# Patient Record
Sex: MALE | Race: WHITE | NOT HISPANIC OR LATINO | Employment: OTHER | URBAN - METROPOLITAN AREA
[De-identification: names, ages, dates, MRNs, and addresses within clinical notes are randomized per-mention and may not be internally consistent; named-entity substitution may affect disease eponyms.]

---

## 2017-10-20 ENCOUNTER — HOSPITAL ENCOUNTER (EMERGENCY)
Facility: MEDICAL CENTER | Age: 67
End: 2017-10-21
Attending: EMERGENCY MEDICINE
Payer: MEDICARE

## 2017-10-20 ENCOUNTER — APPOINTMENT (OUTPATIENT)
Dept: RADIOLOGY | Facility: MEDICAL CENTER | Age: 67
End: 2017-10-20
Attending: EMERGENCY MEDICINE
Payer: MEDICARE
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
## 2017-10-20 DIAGNOSIS — S01.01XA LACERATION OF SCALP, INITIAL ENCOUNTER: ICD-10-CM

## 2017-10-20 DIAGNOSIS — S01.112A LACERATION OF LEFT EYEBROW, INITIAL ENCOUNTER: ICD-10-CM

## 2017-10-20 DIAGNOSIS — S06.0X1A CONCUSSION WITH LOSS OF CONSCIOUSNESS OF 30 MINUTES OR LESS, INITIAL ENCOUNTER: ICD-10-CM

## 2017-10-20 DIAGNOSIS — S00.03XA HEMATOMA OF SCALP, INITIAL ENCOUNTER: ICD-10-CM

## 2017-10-20 PROCEDURE — 90715 TDAP VACCINE 7 YRS/> IM: CPT | Performed by: EMERGENCY MEDICINE

## 2017-10-20 PROCEDURE — 99284 EMERGENCY DEPT VISIT MOD MDM: CPT

## 2017-10-20 PROCEDURE — 90471 IMMUNIZATION ADMIN: CPT

## 2017-10-20 PROCEDURE — 304999 HCHG REPAIR-SIMPLE/INTERMED LEVEL 1

## 2017-10-20 PROCEDURE — 303353 HCHG DERMABOND SKIN ADHESIVE

## 2017-10-20 PROCEDURE — 70450 CT HEAD/BRAIN W/O DYE: CPT

## 2017-10-20 PROCEDURE — 700101 HCHG RX REV CODE 250: Performed by: EMERGENCY MEDICINE

## 2017-10-20 PROCEDURE — 700111 HCHG RX REV CODE 636 W/ 250 OVERRIDE (IP): Performed by: EMERGENCY MEDICINE

## 2017-10-20 RX ORDER — HYDROCODONE BITARTRATE AND ACETAMINOPHEN 5; 325 MG/1; MG/1
1-2 TABLET ORAL EVERY 6 HOURS PRN
Qty: 12 TAB | Refills: 0 | Status: SHIPPED | OUTPATIENT
Start: 2017-10-20 | End: 2024-01-30

## 2017-10-20 RX ORDER — LIDOCAINE HCL/EPINEPHRINE/PF 2%-1:200K
20 VIAL (ML) INJECTION ONCE
Status: COMPLETED | OUTPATIENT
Start: 2017-10-20 | End: 2017-10-20

## 2017-10-20 RX ORDER — HYDROCODONE BITARTRATE AND ACETAMINOPHEN 5; 325 MG/1; MG/1
1 TABLET ORAL ONCE
Status: COMPLETED | OUTPATIENT
Start: 2017-10-21 | End: 2017-10-21

## 2017-10-20 RX ORDER — LIDOCAINE HYDROCHLORIDE AND EPINEPHRINE 10; 10 MG/ML; UG/ML
20 INJECTION, SOLUTION INFILTRATION; PERINEURAL ONCE
Status: DISCONTINUED | OUTPATIENT
Start: 2017-10-20 | End: 2017-10-20

## 2017-10-20 RX ADMIN — CLOSTRIDIUM TETANI TOXOID ANTIGEN (FORMALDEHYDE INACTIVATED), CORYNEBACTERIUM DIPHTHERIAE TOXOID ANTIGEN (FORMALDEHYDE INACTIVATED), BORDETELLA PERTUSSIS TOXOID ANTIGEN (GLUTARALDEHYDE INACTIVATED), BORDETELLA PERTUSSIS FILAMENTOUS HEMAGGLUTININ ANTIGEN (FORMALDEHYDE INACTIVATED), BORDETELLA PERTUSSIS PERTACTIN ANTIGEN, AND BORDETELLA PERTUSSIS FIMBRIAE 2/3 ANTIGEN 0.5 ML: 5; 2; 2.5; 5; 3; 5 INJECTION, SUSPENSION INTRAMUSCULAR at 22:35

## 2017-10-20 RX ADMIN — LIDOCAINE HYDROCHLORIDE,EPINEPHRINE BITARTRATE 20 ML: 20; .005 INJECTION, SOLUTION EPIDURAL; INFILTRATION; INTRACAUDAL; PERINEURAL at 22:30

## 2017-10-21 VITALS
BODY MASS INDEX: 30.06 KG/M2 | SYSTOLIC BLOOD PRESSURE: 134 MMHG | OXYGEN SATURATION: 93 % | WEIGHT: 210 LBS | RESPIRATION RATE: 18 BRPM | HEIGHT: 70 IN | DIASTOLIC BLOOD PRESSURE: 87 MMHG | TEMPERATURE: 97.7 F | HEART RATE: 75 BPM

## 2017-10-21 PROCEDURE — 700102 HCHG RX REV CODE 250 W/ 637 OVERRIDE(OP): Performed by: EMERGENCY MEDICINE

## 2017-10-21 PROCEDURE — A9270 NON-COVERED ITEM OR SERVICE: HCPCS | Performed by: EMERGENCY MEDICINE

## 2017-10-21 RX ADMIN — HYDROCODONE BITARTRATE AND ACETAMINOPHEN 1 TABLET: 5; 325 TABLET ORAL at 00:16

## 2017-10-21 NOTE — ED NOTES
BIB EMS    Chief Complaint   Patient presents with   • T-5000 GLF     pt reports have GLF after tripping on his own feet and hitting left side of head with unknown LOC. pt denies N/V, blurred vision. pt had hematoma over left eye adn left sided head lac       Pt in gown, on monitor, chart up for ERP.

## 2017-10-21 NOTE — ED PROVIDER NOTES
ED Provider Note    Scribed for Sai Dickson M.D. by Geni Perdomo. 10/20/2017, 10:00 PM.    Primary care provider: Reji Johnson M.D.  Means of arrival: Ambulance  History obtained from: Patient  History limited by: None    CHIEF COMPLAINT  Chief Complaint   Patient presents with   • T-5000 GLF     pt reports have GLF after tripping on his own feet and hitting left side of head with unknown LOC. pt denies N/V, blurred vision. pt had hematoma over left eye adn left sided head lac       HPI  Onofre Mauricio Mccormack is a 67 y.o. male who presents to the Emergency Department for evaluation after a ground level fall with associated left sided head trauma that occurred earlier tonight. The patient reports he tripped over his feet and and hit the left side of his head on the concrete. He states he lost consciousness and woke up with people around him. The patient denies back pain, leg pain, abdominal pain, vision changes, vomiting, or neck pain. He is not currently on any blood thinners. The patient reports his last Tetanus shot was more than five years ago. He does not identify any exacerbating factors at this time.     REVIEW OF SYSTEMS  ROS  Pertinent Positivies: ground level fall, head trauma.  Pertinent Negatives: back pain, leg pain, abdominal pain, vision changes, vomiting, or neck pain.  As above, all other systems are negative.  C.    PAST MEDICAL HISTORY   has a past medical history of AAA (abdominal aortic aneurysm) (CMS-[facility]); Arthritis; Dental disorder; Pneumonia (04/2015); and Snoring.    SURGICAL HISTORY   has a past surgical history that includes other abdominal surgery (1980's); other; aaa with stent graft (11/19/2014); and lumbar fusion posterior (Right, 10/5/2015).    SOCIAL HISTORY  Social History   Substance Use Topics   • Smoking status: Current Every Day Smoker     Packs/day: 0.25     Years: 40.00     Types: Cigarettes   • Smokeless tobacco: Never Used   • Alcohol use No      History   Drug Use  "No       FAMILY HISTORY  History reviewed. No pertinent family history.    CURRENT MEDICATIONS  Home Medications    **Home medications have not yet been reviewed for this encounter**         ALLERGIES  Allergies   Allergen Reactions   • Penicillins Hives and Itching     Unknown as a child       PHYSICAL EXAM  VITAL SIGNS: /61   Pulse 66   Temp 36.5 °C (97.7 °F)   Resp 18   Ht 1.778 m (5' 10\")   Wt 95.3 kg (210 lb)   BMI 30.13 kg/m²     Constitutional: Well developed, Well nourished, Mild distress.   HENT: No bony tenderness to face, Tympanic membranes clear bilaterally, Normocephalic, Atraumatic, Oropharynx moist.   Eyes: 1cm laceration on left eye with 4cm x 3cm hematoma above left eye, Additional slight 1cm laceration over parietal, PERRLA 3mm, Conjunctiva normal, No discharge.   Neck: Supple, No stridor, no vertebral point tenderness.   Cardiovascular: Normal heart rate, Normal rhythm, No murmurs, equal pulses.   Pulmonary: Normal breath sounds, No respiratory distress, No wheezing, No rales, No rhonchi.  Chest: No chest wall tenderness or deformity.   Abdomen:Soft, No tenderness, No masses, no rebound, no guarding.   Back: No vertebral point tenderness, No CVA tenderness.   Musculoskeletal: No major deformities noted, No tenderness.   Skin: Abrasion to left shoulder but no bony tenderness or deformity, Warm, Dry, No erythema, No rash.   Neurologic: Alert & oriented x 3, Normal motor function,  No focal deficits noted.   Psychiatric: Affect normal, Judgment normal, Mood normal.     Laceration Repair Procedure Note    Indication: Laceration    Procedure: The patient was placed in the appropriate position and anesthesia around the lacerations were obtained by infiltration using 1% Lidocaine with epinephrine. The area was then cleansed with betadine and draped in a sterile fashion. The laceration was closed with Dermabond. A second laceration was closed with Dermabond. The wound area was then dressed " with a sterile dressing.      Total repaired wound length: 1 cm, 1cm.     Other Items: None    The patient tolerated the procedure well.    Complications: None    RADIOLOGY  CT-HEAD W/O   Final Result      No acute intracranial abnormality.               INTERPRETING LOCATION:  [address] [place] [address] [place], [address]        The radiologist's interpretation of all radiological studies have been reviewed by me.    COURSE & MEDICAL DECISION MAKING  Pertinent Labs & Imaging studies reviewed. (See chart for details)    10:00 PM - Patient seen and examined at bedside. He was informed the wound on the left side of his face will be washed out. The patient was also made aware he will likely develop a jennifer eye over the next couple days due to his hematomas. Ordered CT-Head to evaluate his symptoms. The differential diagnoses include but are not limited to: intercranial hemorrhage, laceration, concussion.     11:40 PM - Individually reviewed the patient's CT-Head which reveal no acute intracranial abnormality.    11:42 PM - Patient was reevaluated at bedside. He was informed his CT-Head showed no abnormalities. I performed laceration repairs to the patient's lacerations on his head. The patient was informed to use Tylenol for pain management. He was made aware he will be discharged home at this time.     Medical Decision Making:Patient presents after a fall with loss of consciousness for unknown period time. Given the significant hematomas over the patient's head and lost consciousness CT of the head was done to rule out intercranial hemorrhage is negative. His wounds were cleaned and closed with Dermabond. At this point time I think can be discharged home with a concussion. He was given strict return guidelines.     I reviewed prescription monitoring program for patient's narcotic use before prescribing a scheduled drug.The patient will not drink alcohol nor drive with prescribed medications. The patient will return for new or worsening  symptoms and is stable at the time of discharge.    DISPOSITION:  Patient will be discharged home in stable condition.    FOLLOW UP:  Reji Johnson M.D.  [address]  [address]  [place] [address]  [phone]    Schedule an appointment as soon as possible for a visit in 1 week  For wound re-check      OUTPATIENT MEDICATIONS:  Discharge Medication List as of 10/21/2017 12:13 AM      START taking these medications    Details   hydrocodone-acetaminophen (NORCO) 5-325 MG Tab per tablet Take 1-2 Tabs by mouth every 6 hours as needed., Disp-12 Tab, R-0, Print Rx Paper               FINAL IMPRESSION  1. Laceration of scalp, initial encounter    2. Laceration of left eyebrow, initial encounter    3. Concussion with loss of consciousness of 30 minutes or less, initial encounter    4. Fall, initial encounter    5. Hematoma of scalp, initial encounter          Geni ANTHONY (Scribe), am scribing for, and in the presence of, Sai Dickson M.D.    Electronically signed by: Geni Perdomo (Scribe), 10/20/2017    ISai M.D. personally performed the services described in this documentation, as scribed by Geni Perdomo in my presence, and it is both accurate and complete.    The note accurately reflects work and decisions made by me.  Sai Dickson  10/21/2017  6:40 AM

## 2017-10-21 NOTE — DISCHARGE INSTRUCTIONS
Return if you have new or different headache, confusion, vision changes, vomiting, redness, increasing pain, or pus.     Concussion, Adult  A concussion, or closed-head injury, is a brain injury caused by a direct blow to the head or by a quick and sudden movement (jolt) of the head or neck. Concussions are usually not life-threatening. Even so, the effects of a concussion can be serious. If you have had a concussion before, you are more likely to experience concussion-like symptoms after a direct blow to the head.   CAUSES  · Direct blow to the head, such as from running into another player during a soccer game, being hit in a fight, or hitting your head on a hard surface.  · A jolt of the head or neck that causes the brain to move back and forth inside the skull, such as in a car crash.  SIGNS AND SYMPTOMS  The signs of a concussion can be hard to notice. Early on, they may be missed by you, family members, and health care providers. You may look fine but act or feel differently.  Symptoms are usually temporary, but they may last for days, weeks, or even longer. Some symptoms may appear right away while others may not show up for hours or days. Every head injury is different. Symptoms include:  · Mild to moderate headaches that will not go away.  · A feeling of pressure inside your head.  · Having more trouble than usual:  ¨ Learning or remembering things you have heard.  ¨ Answering questions.  ¨ Paying attention or concentrating.  ¨ Organizing daily tasks.  ¨ Making decisions and solving problems.  · Slowness in thinking, acting or reacting, speaking, or reading.  · Getting lost or being easily confused.  · Feeling tired all the time or lacking energy (fatigued).  · Feeling drowsy.  · Sleep disturbances.  ¨ Sleeping more than usual.  ¨ Sleeping less than usual.  ¨ Trouble falling asleep.  ¨ Trouble sleeping (insomnia).  · Loss of balance or feeling lightheaded or dizzy.  · Nausea or vomiting.  · Numbness or  tingling.  · Increased sensitivity to:  ¨ Sounds.  ¨ Lights.  ¨ Distractions.  · Vision problems or eyes that tire easily.  · Diminished sense of taste or smell.  · Ringing in the ears.  · Mood changes such as feeling sad or anxious.  · Becoming easily irritated or angry for little or no reason.  · Lack of motivation.  · Seeing or hearing things other people do not see or hear (hallucinations).  DIAGNOSIS  Your health care provider can usually diagnose a concussion based on a description of your injury and symptoms. He or she will ask whether you passed out (lost consciousness) and whether you are having trouble remembering events that happened right before and during your injury.  Your evaluation might include:  · A brain scan to look for signs of injury to the brain. Even if the test shows no injury, you may still have a concussion.  · Blood tests to be sure other problems are not present.  TREATMENT  · Concussions are usually treated in an emergency department, in urgent care, or at a clinic. You may need to stay in the hospital overnight for further treatment.  · Tell your health care provider if you are taking any medicines, including prescription medicines, over-the-counter medicines, and natural remedies. Some medicines, such as blood thinners (anticoagulants) and aspirin, may increase the chance of complications. Also tell your health care provider whether you have had alcohol or are taking illegal drugs. This information may affect treatment.  · Your health care provider will send you home with important instructions to follow.  · How fast you will recover from a concussion depends on many factors. These factors include how severe your concussion is, what part of your brain was injured, your age, and how healthy you were before the concussion.  · Most people with mild injuries recover fully. Recovery can take time. In general, recovery is slower in older persons. Also, persons who have had a concussion in  the past or have other medical problems may find that it takes longer to recover from their current injury.  HOME CARE INSTRUCTIONS  General Instructions  · Carefully follow the directions your health care provider gave you.  · Only take over-the-counter or prescription medicines for pain, discomfort, or fever as directed by your health care provider.  · Take only those medicines that your health care provider has approved.  · Do not drink alcohol until your health care provider says you are well enough to do so. Alcohol and certain other drugs may slow your recovery and can put you at risk of further injury.  · If it is harder than usual to remember things, write them down.  · If you are easily distracted, try to do one thing at a time. For example, do not try to watch TV while fixing dinner.  · Talk with family members or close friends when making important decisions.  · Keep all follow-up appointments. Repeated evaluation of your symptoms is recommended for your recovery.  · Watch your symptoms and tell others to do the same. Complications sometimes occur after a concussion. Older adults with a brain injury may have a higher risk of serious complications, such as a blood clot on the brain.  · Tell your teachers, school nurse, school counselor, , , or  about your injury, symptoms, and restrictions. Tell them about what you can or cannot do. They should watch for:  ¨ Increased problems with attention or concentration.  ¨ Increased difficulty remembering or learning new information.  ¨ Increased time needed to complete tasks or assignments.  ¨ Increased irritability or decreased ability to cope with stress.  ¨ Increased symptoms.  · Rest. Rest helps the brain to heal. Make sure you:  ¨ Get plenty of sleep at night. Avoid staying up late at night.  ¨ Keep the same bedtime hours on weekends and weekdays.  ¨ Rest during the day. Take daytime naps or rest breaks when you feel  tired.  · Limit activities that require a lot of thought or concentration. These include:  ¨ Doing homework or job-related work.  ¨ Watching TV.  ¨ Working on the computer.  · Avoid any situation where there is potential for another head injury (football, hockey, soccer, basketball, martial arts, downhill snow sports and horseback riding). Your condition will get worse every time you experience a concussion. You should avoid these activities until you are evaluated by the appropriate follow-up health care providers.  Returning To Your Regular Activities  You will need to return to your normal activities slowly, not all at once. You must give your body and brain enough time for recovery.  · Do not return to sports or other athletic activities until your health care provider tells you it is safe to do so.  · Ask your health care provider when you can drive, ride a bicycle, or operate heavy machinery. Your ability to react may be slower after a brain injury. Never do these activities if you are dizzy.  · Ask your health care provider about when you can return to work or school.  Preventing Another Concussion  It is very important to avoid another brain injury, especially before you have recovered. In rare cases, another injury can lead to permanent brain damage, brain swelling, or death. The risk of this is greatest during the first 7-10 days after a head injury. Avoid injuries by:  · Wearing a seat belt when riding in a car.  · Drinking alcohol only in moderation.  · Wearing a helmet when biking, skiing, skateboarding, skating, or doing similar activities.  · Avoiding activities that could lead to a second concussion, such as contact or recreational sports, until your health care provider says it is okay.  · Taking safety measures in your home.  ¨ Remove clutter and tripping hazards from floors and stairways.  ¨ Use grab bars in bathrooms and handrails by stairs.  ¨ Place non-slip mats on floors and in  bathtubs.  ¨ Improve lighting in dim areas.  SEEK MEDICAL CARE IF:  · You have increased problems paying attention or concentrating.  · You have increased difficulty remembering or learning new information.  · You need more time to complete tasks or assignments than before.  · You have increased irritability or decreased ability to cope with stress.  · You have more symptoms than before.  Seek medical care if you have any of the following symptoms for more than 2 weeks after your injury:  · Lasting (chronic) headaches.  · Dizziness or balance problems.  · Nausea.  · Vision problems.  · Increased sensitivity to noise or light.  · Depression or mood swings.  · Anxiety or irritability.  · Memory problems.  · Difficulty concentrating or paying attention.  · Sleep problems.  · Feeling tired all the time.  SEEK IMMEDIATE MEDICAL CARE IF:  · You have severe or worsening headaches. These may be a sign of a blood clot in the brain.  · You have weakness (even if only in one hand, leg, or part of the face).  · You have numbness.  · You have decreased coordination.  · You vomit repeatedly.  · You have increased sleepiness.  · One pupil is larger than the other.  · You have convulsions.  · You have slurred speech.  · You have increased confusion. This may be a sign of a blood clot in the brain.  · You have increased restlessness, agitation, or irritability.  · You are unable to recognize people or places.  · You have neck pain.  · It is difficult to wake you up.  · You have unusual behavior changes.  · You lose consciousness.  MAKE SURE YOU:  · Understand these instructions.  · Will watch your condition.  · Will get help right away if you are not doing well or get worse.     This information is not intended to replace advice given to you by your health care provider. Make sure you discuss any questions you have with your health care provider.     Document Released: 03/09/2005 Document Revised: 01/08/2016 Document Reviewed:  07/10/2014  [employer] Interactive Patient Education ©2016 [employer]      Tissue Adhesive Wound Care  Some cuts, wounds, lacerations, and incisions can be repaired by using tissue adhesive. Tissue adhesive is like glue. It holds the skin together, allowing for faster healing. It forms a strong bond on the skin in about 1 minute and reaches its full strength in about 2 or 3 minutes. The adhesive disappears naturally while the wound is healing. It is important to take proper care of your wound at home while it heals.   HOME CARE INSTRUCTIONS   · Showers are allowed. Do not soak the area containing the tissue adhesive. Do not take baths, swim, or use hot tubs. Do not use any soaps or ointments on the wound. Certain ointments can weaken the glue.  · If a bandage (dressing) has been applied, follow your health care provider's instructions for how often to change the dressing.    · Keep the dressing dry if one has been applied.    · Do not scratch, pick, or rub the adhesive.    · Do not place tape over the adhesive. The adhesive could come off when pulling the tape off.    · Protect the wound from further injury until it is healed.    · Protect the wound from sun and tanning bed exposure while it is healing and for several weeks after healing.    · Only take over-the-counter or prescription medicines as directed by your health care provider.    · Keep all follow-up appointments as directed by your health care provider.  SEEK IMMEDIATE MEDICAL CARE IF:   · Your wound becomes red, swollen, hot, or tender.    · You develop a rash after the glue is applied.  · You have increasing pain in the wound.    · You have a red streak that goes away from the wound.    · You have pus coming from the wound.    · You have increased bleeding.  · You have a fever.  · You have shaking chills.    · You notice a bad smell coming from the wound.    · Your wound or adhesive breaks open.    MAKE SURE YOU:   · Understand these  instructions.  · Will watch your condition.  · Will get help right away if you are not doing well or get worse.     This information is not intended to replace advice given to you by your health care provider. Make sure you discuss any questions you have with your health care provider.     Document Released: 06/13/2002 Document Revised: 10/08/2014 Document Reviewed: 07/09/2014  [employer] Interactive Patient Education ©2016 [employer]          Hematoma  A hematoma is a collection of blood under the skin, in an organ, in a body space, in a joint space, or in other tissue. The blood can clot to form a lump that you can see and feel. The lump is often firm and may sometimes become sore and tender. Most hematomas get better in a few days to weeks. However, some hematomas may be serious and require medical care. Hematomas can range in size from very small to very large.  CAUSES   A hematoma can be caused by a blunt or penetrating injury. It can also be caused by spontaneous leakage from a blood vessel under the skin. Spontaneous leakage from a blood vessel is more likely to occur in older people, especially those taking blood thinners. Sometimes, a hematoma can develop after certain medical procedures.  SIGNS AND SYMPTOMS   · A firm lump on the body.  · Possible pain and tenderness in the area.  · Bruising. Blue, dark blue, purple-red, or yellowish skin may appear at the site of the hematoma if the hematoma is close to the surface of the skin.  For hematomas in deeper tissues or body spaces, the signs and symptoms may be subtle. For example, an intra-abdominal hematoma may cause abdominal pain, weakness, fainting, and shortness of breath. An intracranial hematoma may cause a headache or symptoms such as weakness, trouble speaking, or a change in consciousness.  DIAGNOSIS   A hematoma can usually be diagnosed based on your medical history and a physical exam. Imaging tests may be needed if your health care provider  suspects a hematoma in deeper tissues or body spaces, such as the abdomen, head, or chest. These tests may include ultrasonography or a CT scan.   TREATMENT   Hematomas usually go away on their own over time. Rarely does the blood need to be drained out of the body. Large hematomas or those that may affect vital organs will sometimes need surgical drainage or monitoring.  HOME CARE INSTRUCTIONS   · Apply ice to the injured area:    ¨ Put ice in a plastic bag.    ¨ Place a towel between your skin and the bag.    ¨ Leave the ice on for 20 minutes, 2-3 times a day for the first 1 to 2 days.    · After the first 2 days, switch to using warm compresses on the hematoma.    · Elevate the injured area to help decrease pain and swelling. Wrapping the area with an elastic bandage may also be helpful. Compression helps to reduce swelling and promotes shrinking of the hematoma. Make sure the bandage is not wrapped too tight.    · If your hematoma is on a lower extremity and is painful, crutches may be helpful for a couple days.    · Only take over-the-counter or prescription medicines as directed by your health care provider.  SEEK IMMEDIATE MEDICAL CARE IF:   · You have increasing pain, or your pain is not controlled with medicine.    · You have a fever.    · You have worsening swelling or discoloration.    · Your skin over the hematoma breaks or starts bleeding.    · Your hematoma is in your chest or abdomen and you have weakness, shortness of breath, or a change in consciousness.  · Your hematoma is on your scalp (caused by a fall or injury) and you have a worsening headache or a change in alertness or consciousness.  MAKE SURE YOU:   · Understand these instructions.  · Will watch your condition.  · Will get help right away if you are not doing well or get worse.     This information is not intended to replace advice given to you by your health care provider. Make sure you discuss any questions you have with your health care  provider.     Document Released: 08/01/2005 Document Revised: 08/20/2014 Document Reviewed: 05/28/2014  [employer] Interactive Patient Education ©2016 [employer]

## 2017-10-21 NOTE — ED NOTES
Pt given d/c instructions/prescription/home care instructions, pt given 1 Rx,  pt verbalized understanding of POC, pt ambulated to ER lobby, steady gait.

## 2021-03-03 DIAGNOSIS — Z23 NEED FOR VACCINATION: ICD-10-CM

## 2022-08-16 ENCOUNTER — HOSPITAL ENCOUNTER (EMERGENCY)
Facility: MEDICAL CENTER | Age: 72
End: 2022-08-16
Attending: STUDENT IN AN ORGANIZED HEALTH CARE EDUCATION/TRAINING PROGRAM
Payer: MEDICARE

## 2022-08-16 VITALS
DIASTOLIC BLOOD PRESSURE: 83 MMHG | RESPIRATION RATE: 16 BRPM | TEMPERATURE: 97.5 F | BODY MASS INDEX: 30.06 KG/M2 | WEIGHT: 210 LBS | HEART RATE: 63 BPM | HEIGHT: 70 IN | SYSTOLIC BLOOD PRESSURE: 134 MMHG | OXYGEN SATURATION: 97 %

## 2022-08-16 DIAGNOSIS — R55 SYNCOPE, UNSPECIFIED SYNCOPE TYPE: ICD-10-CM

## 2022-08-16 DIAGNOSIS — E86.0 DEHYDRATION: ICD-10-CM

## 2022-08-16 LAB
ALBUMIN SERPL BCP-MCNC: 4 G/DL (ref 3.2–4.9)
ALBUMIN/GLOB SERPL: 1.7 G/DL
ALP SERPL-CCNC: 69 U/L (ref 30–99)
ALT SERPL-CCNC: 12 U/L (ref 2–50)
ANION GAP SERPL CALC-SCNC: 11 MMOL/L (ref 7–16)
AST SERPL-CCNC: 12 U/L (ref 12–45)
BASOPHILS # BLD AUTO: 0.5 % (ref 0–1.8)
BASOPHILS # BLD: 0.04 K/UL (ref 0–0.12)
BILIRUB SERPL-MCNC: 0.3 MG/DL (ref 0.1–1.5)
BUN SERPL-MCNC: 15 MG/DL (ref 8–22)
CALCIUM SERPL-MCNC: 8.7 MG/DL (ref 8.5–10.5)
CHLORIDE SERPL-SCNC: 107 MMOL/L (ref 96–112)
CO2 SERPL-SCNC: 21 MMOL/L (ref 20–33)
CREAT SERPL-MCNC: 0.81 MG/DL (ref 0.5–1.4)
EKG IMPRESSION: NORMAL
EOSINOPHIL # BLD AUTO: 0.08 K/UL (ref 0–0.51)
EOSINOPHIL NFR BLD: 1 % (ref 0–6.9)
ERYTHROCYTE [DISTWIDTH] IN BLOOD BY AUTOMATED COUNT: 46 FL (ref 35.9–50)
GFR SERPLBLD CREATININE-BSD FMLA CKD-EPI: 94 ML/MIN/1.73 M 2
GLOBULIN SER CALC-MCNC: 2.3 G/DL (ref 1.9–3.5)
GLUCOSE SERPL-MCNC: 143 MG/DL (ref 65–99)
HCT VFR BLD AUTO: 41.2 % (ref 42–52)
HGB BLD-MCNC: 13.9 G/DL (ref 14–18)
IMM GRANULOCYTES # BLD AUTO: 0.03 K/UL (ref 0–0.11)
IMM GRANULOCYTES NFR BLD AUTO: 0.4 % (ref 0–0.9)
LYMPHOCYTES # BLD AUTO: 1.62 K/UL (ref 1–4.8)
LYMPHOCYTES NFR BLD: 20.9 % (ref 22–41)
MCH RBC QN AUTO: 29.9 PG (ref 27–33)
MCHC RBC AUTO-ENTMCNC: 33.7 G/DL (ref 33.7–35.3)
MCV RBC AUTO: 88.6 FL (ref 81.4–97.8)
MONOCYTES # BLD AUTO: 0.77 K/UL (ref 0–0.85)
MONOCYTES NFR BLD AUTO: 9.9 % (ref 0–13.4)
NEUTROPHILS # BLD AUTO: 5.21 K/UL (ref 1.82–7.42)
NEUTROPHILS NFR BLD: 67.3 % (ref 44–72)
NRBC # BLD AUTO: 0 K/UL
NRBC BLD-RTO: 0 /100 WBC
PLATELET # BLD AUTO: 193 K/UL (ref 164–446)
PMV BLD AUTO: 9.4 FL (ref 9–12.9)
POTASSIUM SERPL-SCNC: 4.5 MMOL/L (ref 3.6–5.5)
PROT SERPL-MCNC: 6.3 G/DL (ref 6–8.2)
RBC # BLD AUTO: 4.65 M/UL (ref 4.7–6.1)
SODIUM SERPL-SCNC: 139 MMOL/L (ref 135–145)
WBC # BLD AUTO: 7.8 K/UL (ref 4.8–10.8)

## 2022-08-16 PROCEDURE — 99284 EMERGENCY DEPT VISIT MOD MDM: CPT

## 2022-08-16 PROCEDURE — 93005 ELECTROCARDIOGRAM TRACING: CPT | Performed by: STUDENT IN AN ORGANIZED HEALTH CARE EDUCATION/TRAINING PROGRAM

## 2022-08-16 PROCEDURE — 85025 COMPLETE CBC W/AUTO DIFF WBC: CPT

## 2022-08-16 PROCEDURE — 80053 COMPREHEN METABOLIC PANEL: CPT

## 2022-08-16 PROCEDURE — 700105 HCHG RX REV CODE 258: Performed by: STUDENT IN AN ORGANIZED HEALTH CARE EDUCATION/TRAINING PROGRAM

## 2022-08-16 PROCEDURE — 36415 COLL VENOUS BLD VENIPUNCTURE: CPT

## 2022-08-16 PROCEDURE — 93005 ELECTROCARDIOGRAM TRACING: CPT

## 2022-08-16 RX ORDER — SODIUM CHLORIDE 9 MG/ML
1000 INJECTION, SOLUTION INTRAVENOUS ONCE
Status: COMPLETED | OUTPATIENT
Start: 2022-08-16 | End: 2022-08-16

## 2022-08-16 RX ADMIN — SODIUM CHLORIDE 1000 ML: 9 INJECTION, SOLUTION INTRAVENOUS at 22:26

## 2022-08-17 NOTE — DISCHARGE INSTRUCTIONS
If you get dizzy and you pass out again or if you develop any chest pain shortness of breath return to the ER right away otherwise get lots of rest drink lots of fluids and return to the ER with any other new or concerning symptoms

## 2022-08-17 NOTE — ED TRIAGE NOTES
"Chief Complaint   Patient presents with    Syncope     Patient BIB EMS after a witnessed syncopal event. Patient was at a bar with friends and \"passed out and woke up on the floor.\" Patient found to be hypotensive by EMS and given 1 L IVF.    Patient AAO4, GCS 15 on arrival.     112/70  62  96% RA 16 RR      "

## 2022-08-17 NOTE — ED NOTES
Patient educated on discharge instructions, follow up appointments, prescriptions, and home care. Patient verbalized understanding. Patient ambulated well to [facility].

## 2022-08-17 NOTE — ED PROVIDER NOTES
CHIEF COMPLAINT  Chief Complaint   Patient presents with    Syncope       HPI  Onofre Mccormack is a 71 y.o. male who presents evaluation of a syncopal episode.  Patient states that he was at a bar he finished playing pool when he stood up walked over to the bar, he became very sweaty began to feel lightheaded and had a witnessed syncopal episode.  Does state that he was drinking coffee all day, working outside in was not hydrating well he thinks there may be a component of dehydration.  He had no antecedent shortness of breath or chest pain associated with the syncope.  Denies any known history of cardiac arrhythmias, congestive heart failure, DVT or PE.  Patient was found to be hypotensive by EMS was bolused with a liter of fluids with resolution of his hypotension, after the liter fluid bolus the patient did state that he was feeling much better.    REVIEW OF SYSTEMS  See HPI for further details. All other systems are negative.     PAST MEDICAL HISTORY   has a past medical history of AAA (abdominal aortic aneurysm) (HCC), Arthritis, Dental disorder, Pneumonia (04/2015), and Snoring.    SOCIAL HISTORY  Social History     Tobacco Use    Smoking status: Every Day     Packs/day: 0.25     Years: 40.00     Pack years: 10.00     Types: Cigarettes    Smokeless tobacco: Never   Substance and Sexual Activity    Alcohol use: No    Drug use: No    Sexual activity: Not on file       SURGICAL HISTORY   has a past surgical history that includes other abdominal surgery (1980's); other; aaa with stent graft (11/19/2014); and fusion, spine, lumbar, plif (Right, 10/5/2015).    CURRENT MEDICATIONS  Home Medications       Reviewed by Meng Godinez R.N. (Registered Nurse) on 08/16/22 at 3974  Med List Status: <None>     Medication Last Dose Status   hydrocodone-acetaminophen (NORCO) 5-325 MG Tab per tablet  Active                    ALLERGIES  Allergies   Allergen Reactions    Penicillins Hives and Itching     Unknown as  "a child       FAMILY HISTORY  No pertinent family history    PHYSICAL EXAM   /70   Pulse 64   Temp 36.1 °C (97 °F) (Temporal)   Resp 16   Ht 1.778 m (5' 10\")   Wt 95.3 kg (210 lb)   SpO2 96%   BMI 30.13 kg/m²  @MARY[[id number]::@   Pulse ox interpretation: I interpret this pulse ox as normal.  VITALS - vital signs documented prior to this note have been reviewed and noted,  GENERAL - awake, alert, oriented, GCS 15, no apparent distress, non-toxic  appearing  HEENT - normocephalic, atraumatic, pupils equal, sclera anicteric, mucus  membranes dry  NECK - supple, no meningismus, full active range of motion, trachea midline  CARDIOVASCULAR - regular rate/rhythm, no murmurs/gallops/rubs  PULMONARY - no respiratory distress, speaking in full sentences, clear to  auscultation bilaterally, no wheezing/ronchi/rales, no accessory muscle use  GASTROINTESTINAL - soft, non-tender, non-distended, no rebound, guarding,  or peritonitis  GENITOURINARY - Deferred  NEUROLOGIC - Awake alert, normal mental status, speech fluid, cognition  normal, moves all extremities  MUSCULOSKELETAL - no obvious asymmetry or deformities present  EXTREMITIES - warm, well-perfused, no cyanosis or significant edema  DERMATOLOGIC - warm, dry, no rashes, no jaundice  PSYCHIATRIC - normal affect, normal insight, normal concentration            EKG  EKG shows a normal sinus rhythm with a normal WI QRS QTc interval there is a normal axis.  There is no ST elevation or depression to suggest ischemia no abnormal T wave inversion.  There is no STEMI pattern.  Interpretation is normal sinus rhythm as interpreted by myself  Rate is 69 no evidence of WPW Brugada or HOCM longer short QTC    LABS      Labs Reviewed - No data to display      Pertinent Labs & Imaging studies reviewed. (See chart for details)    RADIOLOGY  No orders to display             ED COURSE/PROCEDURES      2304 patient is resting comfortably no observed arrhythmias while being monitored " in the ER symptoms have since resolved he is awake alert ambulatory with a steady gait and feels comfortable being discharged home    Medications   NS (BOLUS) infusion 1,000 mL (has no administration in time range)               MEDICAL DECISION MAKING    This patient presented with a history of a syncopal episode.  Differential initial included was not limited to vasovagal or orthostatic hypotension dehydration and electrolyte abnormality arrhythmia among many other other considerations.  Patient was bolused with IV fluids due to his initial hypotension with resolution of his hypotension however EKG does have dry mucous membranes as such he was given an additional liter fluid bolus. Patient is now resting comfortably and feels better, is alert, and is in no distress. The repeat examination is unremarkable and benign. There are no appreciable significant murmurs or carotid bruit on exam. Work up is non actionable. The patient is neurologically intact, has a normal mental status, and is ambulatory in the ED. The electrocardiogram shows no signs of acute ischemia wpw brugada HOCM long or short QTc, and the history, exam, diagnostic testing and current condition do not suggest that this patient is having an acute myocardial infarction, significant arrhythmia, unstable angina, a stroke/TIA, a significant vascular event, Patient as been observed with out evidence of arrhythmia and has returned to baseline and is feeling better, thus I do believe is stable for discharge at this time.            FINAL IMPRESSION  1.  Syncope  2.  Dehydration           Electronically signed by: Yehuda Mccarthy D.O., 8/16/2022 10:12 PM      Dictation Disclaimer  Please note this report has been produced using speech recognition software and  may contain errors related to that system, including errors seen in grammar,  punctuation and spelling, as well as words and phrases that may be inappropriate.  If there are any questions or  concerns, please feel free to contact the dictating  physician for clarification.

## 2022-11-02 ENCOUNTER — PATIENT MESSAGE (OUTPATIENT)
Dept: HEALTH INFORMATION MANAGEMENT | Facility: OTHER | Age: 72
End: 2022-11-02

## 2024-01-30 ENCOUNTER — APPOINTMENT (OUTPATIENT)
Dept: RADIOLOGY | Facility: MEDICAL CENTER | Age: 74
End: 2024-01-30
Attending: EMERGENCY MEDICINE
Payer: MEDICARE

## 2024-01-30 ENCOUNTER — HOSPITAL ENCOUNTER (EMERGENCY)
Facility: MEDICAL CENTER | Age: 74
End: 2024-01-31
Attending: EMERGENCY MEDICINE | Admitting: INTERNAL MEDICINE
Payer: MEDICARE

## 2024-01-30 DIAGNOSIS — R55 SYNCOPE, UNSPECIFIED SYNCOPE TYPE: ICD-10-CM

## 2024-01-30 DIAGNOSIS — I95.9 HYPOTENSION, UNSPECIFIED HYPOTENSION TYPE: Primary | ICD-10-CM

## 2024-01-30 DIAGNOSIS — I71.010: ICD-10-CM

## 2024-01-30 DIAGNOSIS — Z86.79 HISTORY OF ABDOMINAL AORTIC ANEURYSM: ICD-10-CM

## 2024-01-30 DIAGNOSIS — R09.02 HYPOXIA: ICD-10-CM

## 2024-01-30 LAB
ALBUMIN SERPL BCP-MCNC: 3.8 G/DL (ref 3.2–4.9)
ALBUMIN/GLOB SERPL: 1.4 G/DL
ALP SERPL-CCNC: 80 U/L (ref 30–99)
ALT SERPL-CCNC: 14 U/L (ref 2–50)
AMPHET UR QL SCN: NEGATIVE
ANION GAP SERPL CALC-SCNC: 14 MMOL/L (ref 7–16)
APTT PPP: 26.4 SEC (ref 24.7–36)
AST SERPL-CCNC: 18 U/L (ref 12–45)
BARBITURATES UR QL SCN: NEGATIVE
BASOPHILS # BLD AUTO: 0.6 % (ref 0–1.8)
BASOPHILS # BLD: 0.05 K/UL (ref 0–0.12)
BENZODIAZ UR QL SCN: NEGATIVE
BILIRUB SERPL-MCNC: 0.3 MG/DL (ref 0.1–1.5)
BUN SERPL-MCNC: 16 MG/DL (ref 8–22)
BZE UR QL SCN: NEGATIVE
CALCIUM ALBUM COR SERPL-MCNC: 8.6 MG/DL (ref 8.5–10.5)
CALCIUM SERPL-MCNC: 8.4 MG/DL (ref 8.5–10.5)
CANNABINOIDS UR QL SCN: NEGATIVE
CFT BLD TEG: 3.7 MIN (ref 4.6–9.1)
CFT P HPASE BLD TEG: 4 MIN (ref 4.3–8.3)
CHLORIDE SERPL-SCNC: 108 MMOL/L (ref 96–112)
CLOT ANGLE BLD TEG: 70.6 DEGREES (ref 63–78)
CO2 SERPL-SCNC: 17 MMOL/L (ref 20–33)
CREAT SERPL-MCNC: 0.89 MG/DL (ref 0.5–1.4)
CT.EXTRINSIC BLD ROTEM: 1.8 MIN (ref 0.8–2.1)
EKG IMPRESSION: NORMAL
EOSINOPHIL # BLD AUTO: 0.16 K/UL (ref 0–0.51)
EOSINOPHIL NFR BLD: 1.9 % (ref 0–6.9)
ERYTHROCYTE [DISTWIDTH] IN BLOOD BY AUTOMATED COUNT: 50.4 FL (ref 35.9–50)
ETHANOL BLD-MCNC: <10.1 MG/DL
FENTANYL UR QL: NEGATIVE
GFR SERPLBLD CREATININE-BSD FMLA CKD-EPI: 91 ML/MIN/1.73 M 2
GLOBULIN SER CALC-MCNC: 2.7 G/DL (ref 1.9–3.5)
GLUCOSE SERPL-MCNC: 203 MG/DL (ref 65–99)
HCT VFR BLD AUTO: 46.4 % (ref 42–52)
HGB BLD-MCNC: 14.8 G/DL (ref 14–18)
IMM GRANULOCYTES # BLD AUTO: 0.13 K/UL (ref 0–0.11)
IMM GRANULOCYTES NFR BLD AUTO: 1.5 % (ref 0–0.9)
INR PPP: 1.23 (ref 0.87–1.13)
LACTATE SERPL-SCNC: 4.3 MMOL/L (ref 0.5–2)
LYMPHOCYTES # BLD AUTO: 2.66 K/UL (ref 1–4.8)
LYMPHOCYTES NFR BLD: 31.4 % (ref 22–41)
MCF BLD TEG: 53 MM (ref 52–69)
MCF.PLATELET INHIB BLD ROTEM: 15.6 MM (ref 15–32)
MCH RBC QN AUTO: 29.1 PG (ref 27–33)
MCHC RBC AUTO-ENTMCNC: 31.9 G/DL (ref 32.3–36.5)
MCV RBC AUTO: 91.3 FL (ref 81.4–97.8)
METHADONE UR QL SCN: NEGATIVE
MONOCYTES # BLD AUTO: 0.84 K/UL (ref 0–0.85)
MONOCYTES NFR BLD AUTO: 9.9 % (ref 0–13.4)
NEUTROPHILS # BLD AUTO: 4.64 K/UL (ref 1.82–7.42)
NEUTROPHILS NFR BLD: 54.7 % (ref 44–72)
NRBC # BLD AUTO: 0.02 K/UL
NRBC BLD-RTO: 0.2 /100 WBC (ref 0–0.2)
NT-PROBNP SERPL IA-MCNC: 272 PG/ML (ref 0–125)
OPIATES UR QL SCN: NEGATIVE
OXYCODONE UR QL SCN: POSITIVE
PA AA BLD-ACNC: 60.3 % (ref 0–11)
PA ADP BLD-ACNC: 40 % (ref 0–17)
PCP UR QL SCN: NEGATIVE
PLATELET # BLD AUTO: 195 K/UL (ref 164–446)
PMV BLD AUTO: 9.7 FL (ref 9–12.9)
POTASSIUM SERPL-SCNC: 3.8 MMOL/L (ref 3.6–5.5)
PROPOXYPH UR QL SCN: NEGATIVE
PROT SERPL-MCNC: 6.5 G/DL (ref 6–8.2)
PROTHROMBIN TIME: 15.6 SEC (ref 12–14.6)
RBC # BLD AUTO: 5.08 M/UL (ref 4.7–6.1)
SODIUM SERPL-SCNC: 139 MMOL/L (ref 135–145)
TEG ALGORITHM TGALG: ABNORMAL
TROPONIN T SERPL-MCNC: 11 NG/L (ref 6–19)
WBC # BLD AUTO: 8.5 K/UL (ref 4.8–10.8)

## 2024-01-30 PROCEDURE — 96365 THER/PROPH/DIAG IV INF INIT: CPT | Mod: XU

## 2024-01-30 PROCEDURE — 99291 CRITICAL CARE FIRST HOUR: CPT

## 2024-01-30 PROCEDURE — 85347 COAGULATION TIME ACTIVATED: CPT

## 2024-01-30 PROCEDURE — 85576 BLOOD PLATELET AGGREGATION: CPT | Mod: 91

## 2024-01-30 PROCEDURE — 96368 THER/DIAG CONCURRENT INF: CPT

## 2024-01-30 PROCEDURE — 80053 COMPREHEN METABOLIC PANEL: CPT

## 2024-01-30 PROCEDURE — 51702 INSERT TEMP BLADDER CATH: CPT | Mod: XU

## 2024-01-30 PROCEDURE — 96366 THER/PROPH/DIAG IV INF ADDON: CPT | Mod: XU

## 2024-01-30 PROCEDURE — 93005 ELECTROCARDIOGRAM TRACING: CPT | Performed by: EMERGENCY MEDICINE

## 2024-01-30 PROCEDURE — 85384 FIBRINOGEN ACTIVITY: CPT | Mod: 91

## 2024-01-30 PROCEDURE — 700105 HCHG RX REV CODE 258: Performed by: EMERGENCY MEDICINE

## 2024-01-30 PROCEDURE — 96375 TX/PRO/DX INJ NEW DRUG ADDON: CPT | Mod: XU

## 2024-01-30 PROCEDURE — 700111 HCHG RX REV CODE 636 W/ 250 OVERRIDE (IP): Mod: JZ

## 2024-01-30 PROCEDURE — 700101 HCHG RX REV CODE 250: Performed by: EMERGENCY MEDICINE

## 2024-01-30 PROCEDURE — 85025 COMPLETE CBC W/AUTO DIFF WBC: CPT

## 2024-01-30 PROCEDURE — 36415 COLL VENOUS BLD VENIPUNCTURE: CPT

## 2024-01-30 PROCEDURE — 84484 ASSAY OF TROPONIN QUANT: CPT

## 2024-01-30 PROCEDURE — 99291 CRITICAL CARE FIRST HOUR: CPT | Performed by: INTERNAL MEDICINE

## 2024-01-30 PROCEDURE — 303105 HCHG CATHETER EXTRA

## 2024-01-30 PROCEDURE — 99292 CRITICAL CARE ADDL 30 MIN: CPT | Performed by: INTERNAL MEDICINE

## 2024-01-30 PROCEDURE — 71260 CT THORAX DX C+: CPT

## 2024-01-30 PROCEDURE — 85730 THROMBOPLASTIN TIME PARTIAL: CPT

## 2024-01-30 PROCEDURE — 700117 HCHG RX CONTRAST REV CODE 255: Performed by: EMERGENCY MEDICINE

## 2024-01-30 PROCEDURE — 83605 ASSAY OF LACTIC ACID: CPT

## 2024-01-30 PROCEDURE — 70450 CT HEAD/BRAIN W/O DYE: CPT

## 2024-01-30 PROCEDURE — 99285 EMERGENCY DEPT VISIT HI MDM: CPT | Mod: FS | Performed by: THORACIC SURGERY (CARDIOTHORACIC VASCULAR SURGERY)

## 2024-01-30 PROCEDURE — 82077 ASSAY SPEC XCP UR&BREATH IA: CPT

## 2024-01-30 PROCEDURE — 72125 CT NECK SPINE W/O DYE: CPT

## 2024-01-30 PROCEDURE — 85610 PROTHROMBIN TIME: CPT

## 2024-01-30 PROCEDURE — 83880 ASSAY OF NATRIURETIC PEPTIDE: CPT

## 2024-01-30 PROCEDURE — 700111 HCHG RX REV CODE 636 W/ 250 OVERRIDE (IP): Mod: JG | Performed by: EMERGENCY MEDICINE

## 2024-01-30 PROCEDURE — 71045 X-RAY EXAM CHEST 1 VIEW: CPT

## 2024-01-30 PROCEDURE — 80307 DRUG TEST PRSMV CHEM ANLYZR: CPT

## 2024-01-30 RX ORDER — NALOXONE HYDROCHLORIDE 1 MG/ML
INJECTION INTRAMUSCULAR; INTRAVENOUS; SUBCUTANEOUS
Status: COMPLETED
Start: 2024-01-30 | End: 2024-01-30

## 2024-01-30 RX ORDER — NOREPINEPHRINE BITARTRATE 0.03 MG/ML
INJECTION, SOLUTION INTRAVENOUS
Status: DISPENSED
Start: 2024-01-30 | End: 2024-01-31

## 2024-01-30 RX ORDER — NALOXONE HYDROCHLORIDE 1 MG/ML
2 INJECTION INTRAMUSCULAR; INTRAVENOUS; SUBCUTANEOUS ONCE
Status: COMPLETED | OUTPATIENT
Start: 2024-01-30 | End: 2024-01-30

## 2024-01-30 RX ORDER — SODIUM CHLORIDE 9 MG/ML
1000 INJECTION, SOLUTION INTRAVENOUS ONCE
Status: COMPLETED | OUTPATIENT
Start: 2024-01-30 | End: 2024-01-30

## 2024-01-30 RX ORDER — NOREPINEPHRINE BITARTRATE 0.03 MG/ML
0-1 INJECTION, SOLUTION INTRAVENOUS CONTINUOUS
Status: DISCONTINUED | OUTPATIENT
Start: 2024-01-30 | End: 2024-01-31 | Stop reason: HOSPADM

## 2024-01-30 RX ADMIN — IOHEXOL 100 ML: 350 INJECTION, SOLUTION INTRAVENOUS at 21:38

## 2024-01-30 RX ADMIN — NALOXONE HYDROCHLORIDE 2 MG: 1 INJECTION, SOLUTION INTRAMUSCULAR; INTRAVENOUS; SUBCUTANEOUS at 20:45

## 2024-01-30 RX ADMIN — NALOXONE HYDROCHLORIDE 2 MG: 1 INJECTION, SOLUTION INTRAMUSCULAR; INTRAVENOUS; SUBCUTANEOUS at 20:57

## 2024-01-30 RX ADMIN — VASOPRESSIN 0.03 UNITS/MIN: 20 INJECTION INTRAVENOUS at 21:55

## 2024-01-30 RX ADMIN — SODIUM CHLORIDE 1000 ML: 9 INJECTION, SOLUTION INTRAVENOUS at 21:00

## 2024-01-30 RX ADMIN — NALOXONE HYDROCHLORIDE 2 MG: 1 INJECTION INTRAMUSCULAR; INTRAVENOUS; SUBCUTANEOUS at 20:45

## 2024-01-30 RX ADMIN — NOREPINEPHRINE BITARTRATE 0.3 MCG/KG/MIN: 1 INJECTION INTRAVENOUS at 21:30

## 2024-01-30 RX ADMIN — NOREPINEPHRINE BITARTRATE 0.6 MCG/KG/MIN: 1 INJECTION INTRAVENOUS at 21:12

## 2024-01-30 RX ADMIN — NALOXONE HYDROCHLORIDE 2 MG: 1 INJECTION INTRAMUSCULAR; INTRAVENOUS; SUBCUTANEOUS at 20:57

## 2024-01-30 ASSESSMENT — ENCOUNTER SYMPTOMS
PSYCHIATRIC NEGATIVE: 1
LOSS OF CONSCIOUSNESS: 1
CARDIOVASCULAR NEGATIVE: 1
PALPITATIONS: 0
DIAPHORESIS: 1
GASTROINTESTINAL NEGATIVE: 1
COUGH: 0
SHORTNESS OF BREATH: 1
EYES NEGATIVE: 1
HEMOPTYSIS: 0
MUSCULOSKELETAL NEGATIVE: 1

## 2024-01-30 ASSESSMENT — PAIN DESCRIPTION - PAIN TYPE: TYPE: ACUTE PAIN

## 2024-01-30 ASSESSMENT — FIBROSIS 4 INDEX: FIB4 SCORE: 1.29

## 2024-01-31 ENCOUNTER — APPOINTMENT (OUTPATIENT)
Dept: CARDIOLOGY | Facility: MEDICAL CENTER | Age: 74
End: 2024-01-31
Attending: INTERNAL MEDICINE
Payer: MEDICARE

## 2024-01-31 ENCOUNTER — HOSPITAL ENCOUNTER (OUTPATIENT)
Dept: RADIOLOGY | Facility: MEDICAL CENTER | Age: 74
End: 2024-01-31
Attending: NURSE PRACTITIONER

## 2024-01-31 ENCOUNTER — APPOINTMENT (OUTPATIENT)
Dept: RADIOLOGY | Facility: MEDICAL CENTER | Age: 74
End: 2024-01-31
Attending: INTERNAL MEDICINE
Payer: MEDICARE

## 2024-01-31 VITALS
HEIGHT: 70 IN | DIASTOLIC BLOOD PRESSURE: 76 MMHG | SYSTOLIC BLOOD PRESSURE: 109 MMHG | BODY MASS INDEX: 30.78 KG/M2 | RESPIRATION RATE: 19 BRPM | TEMPERATURE: 98.6 F | OXYGEN SATURATION: 97 % | WEIGHT: 215 LBS | HEART RATE: 79 BPM

## 2024-01-31 PROBLEM — R57.9 SHOCK (HCC): Status: ACTIVE | Noted: 2024-01-31

## 2024-01-31 PROBLEM — I71.40 AAA (ABDOMINAL AORTIC ANEURYSM) (HCC): Status: ACTIVE | Noted: 2024-01-31

## 2024-01-31 PROBLEM — R73.9 HYPERGLYCEMIA: Status: ACTIVE | Noted: 2024-01-31

## 2024-01-31 PROBLEM — I71.011 AORTIC ARCH DISSECTION (HCC): Status: ACTIVE | Noted: 2024-01-31

## 2024-01-31 PROBLEM — E87.20 METABOLIC ACIDOSIS: Status: ACTIVE | Noted: 2024-01-31

## 2024-01-31 PROBLEM — R55 SYNCOPE: Status: ACTIVE | Noted: 2024-01-31

## 2024-01-31 PROBLEM — J96.01 ACUTE RESPIRATORY FAILURE WITH HYPOXIA (HCC): Status: ACTIVE | Noted: 2024-01-31

## 2024-01-31 LAB
ALBUMIN SERPL BCP-MCNC: 4 G/DL (ref 3.2–4.9)
ALBUMIN/GLOB SERPL: 1.7 G/DL
ALP SERPL-CCNC: 105 U/L (ref 30–99)
ALT SERPL-CCNC: 96 U/L (ref 2–50)
ANION GAP SERPL CALC-SCNC: 13 MMOL/L (ref 7–16)
AST SERPL-CCNC: 96 U/L (ref 12–45)
BILIRUB SERPL-MCNC: 1.3 MG/DL (ref 0.1–1.5)
BUN SERPL-MCNC: 23 MG/DL (ref 8–22)
CALCIUM ALBUM COR SERPL-MCNC: 8.1 MG/DL (ref 8.5–10.5)
CALCIUM SERPL-MCNC: 8.1 MG/DL (ref 8.5–10.5)
CFT BLD TEG: 4.7 MIN (ref 4.6–9.1)
CFT P HPASE BLD TEG: 5 MIN (ref 4.3–8.3)
CHLORIDE SERPL-SCNC: 104 MMOL/L (ref 96–112)
CLOT ANGLE BLD TEG: 64.9 DEGREES (ref 63–78)
CLOT LYSIS 30M P MA LENFR BLD TEG: 0 % (ref 0–2.6)
CO2 SERPL-SCNC: 20 MMOL/L (ref 20–33)
CREAT SERPL-MCNC: 1.51 MG/DL (ref 0.5–1.4)
CT.EXTRINSIC BLD ROTEM: 2.3 MIN (ref 0.8–2.1)
FLUAV RNA SPEC QL NAA+PROBE: NEGATIVE
FLUBV RNA SPEC QL NAA+PROBE: NEGATIVE
GFR SERPLBLD CREATININE-BSD FMLA CKD-EPI: 49 ML/MIN/1.73 M 2
GLOBULIN SER CALC-MCNC: 2.4 G/DL (ref 1.9–3.5)
GLUCOSE SERPL-MCNC: 220 MG/DL (ref 65–99)
LV EJECT FRACT  99904: 45
MCF BLD TEG: 50.3 MM (ref 52–69)
MCF.PLATELET INHIB BLD ROTEM: 12.1 MM (ref 15–32)
PA AA BLD-ACNC: 28.9 % (ref 0–11)
PA ADP BLD-ACNC: 43.1 % (ref 0–17)
POTASSIUM SERPL-SCNC: 5.4 MMOL/L (ref 3.6–5.5)
PROT SERPL-MCNC: 6.4 G/DL (ref 6–8.2)
RSV RNA SPEC QL NAA+PROBE: NEGATIVE
SARS-COV-2 RNA RESP QL NAA+PROBE: NOTDETECTED
SODIUM SERPL-SCNC: 137 MMOL/L (ref 135–145)
TEG ALGORITHM TGALG: ABNORMAL

## 2024-01-31 PROCEDURE — 700117 HCHG RX CONTRAST REV CODE 255: Performed by: NURSE PRACTITIONER

## 2024-01-31 PROCEDURE — 85384 FIBRINOGEN ACTIVITY: CPT

## 2024-01-31 PROCEDURE — 85576 BLOOD PLATELET AGGREGATION: CPT | Mod: 91

## 2024-01-31 PROCEDURE — C1751 CATH, INF, PER/CENT/MIDLINE: HCPCS

## 2024-01-31 PROCEDURE — 96366 THER/PROPH/DIAG IV INF ADDON: CPT | Mod: XU

## 2024-01-31 PROCEDURE — 700101 HCHG RX REV CODE 250: Performed by: EMERGENCY MEDICINE

## 2024-01-31 PROCEDURE — 36415 COLL VENOUS BLD VENIPUNCTURE: CPT

## 2024-01-31 PROCEDURE — 700111 HCHG RX REV CODE 636 W/ 250 OVERRIDE (IP): Performed by: EMERGENCY MEDICINE

## 2024-01-31 PROCEDURE — 36556 INSERT NON-TUNNEL CV CATH: CPT | Performed by: INTERNAL MEDICINE

## 2024-01-31 PROCEDURE — 96367 TX/PROPH/DG ADDL SEQ IV INF: CPT | Mod: XU

## 2024-01-31 PROCEDURE — 700105 HCHG RX REV CODE 258: Performed by: EMERGENCY MEDICINE

## 2024-01-31 PROCEDURE — 99285 EMERGENCY DEPT VISIT HI MDM: CPT | Performed by: INTERNAL MEDICINE

## 2024-01-31 PROCEDURE — 0241U HCHG SARS-COV-2 COVID-19 NFCT DS RESP RNA 4 TRGT ED POC: CPT

## 2024-01-31 PROCEDURE — 36556 INSERT NON-TUNNEL CV CATH: CPT

## 2024-01-31 PROCEDURE — 93306 TTE W/DOPPLER COMPLETE: CPT | Mod: 26 | Performed by: INTERNAL MEDICINE

## 2024-01-31 PROCEDURE — 71275 CT ANGIOGRAPHY CHEST: CPT

## 2024-01-31 PROCEDURE — 80053 COMPREHEN METABOLIC PANEL: CPT

## 2024-01-31 PROCEDURE — 85347 COAGULATION TIME ACTIVATED: CPT

## 2024-01-31 PROCEDURE — 93306 TTE W/DOPPLER COMPLETE: CPT

## 2024-01-31 RX ORDER — SODIUM BICARBONATE IN D5W 150/1000ML
PLASTIC BAG, INJECTION (ML) INTRAVENOUS CONTINUOUS
Status: DISCONTINUED | OUTPATIENT
Start: 2024-01-31 | End: 2024-01-31 | Stop reason: HOSPADM

## 2024-01-31 RX ORDER — ESMOLOL HYDROCHLORIDE 20 MG/ML
0-200 INJECTION, SOLUTION INTRAVENOUS CONTINUOUS
Status: DISCONTINUED | OUTPATIENT
Start: 2024-01-31 | End: 2024-01-31 | Stop reason: HOSPADM

## 2024-01-31 RX ADMIN — SODIUM BICARBONATE 75 ML/HR: 84 INJECTION, SOLUTION INTRAVENOUS at 02:26

## 2024-01-31 RX ADMIN — NOREPINEPHRINE BITARTRATE 0.3 MCG/KG/MIN: 1 INJECTION INTRAVENOUS at 00:51

## 2024-01-31 RX ADMIN — IOHEXOL 100 ML: 350 INJECTION, SOLUTION INTRAVENOUS at 06:26

## 2024-01-31 ASSESSMENT — ENCOUNTER SYMPTOMS
NERVOUS/ANXIOUS: 0
BACK PAIN: 1
SENSORY CHANGE: 0
VOMITING: 0
SPUTUM PRODUCTION: 0
COUGH: 0
CHILLS: 0
EYES NEGATIVE: 1
HEADACHES: 0
SPEECH CHANGE: 0
FEVER: 0
PALPITATIONS: 0
FLANK PAIN: 0
BRUISES/BLEEDS EASILY: 0
SHORTNESS OF BREATH: 0
ABDOMINAL PAIN: 0
FOCAL WEAKNESS: 0
SORE THROAT: 0
NAUSEA: 0

## 2024-01-31 NOTE — ASSESSMENT & PLAN NOTE
Nonfasting blood sugar elevated  No history of diabetes  Monitor glucose levels and add SSI/hypoglycemia protocols as needed

## 2024-01-31 NOTE — ASSESSMENT & PLAN NOTE
Currently in no distress  RT protocols  Currently on 10 L by OxyMask with sats in the high 90s  Chest x-ray without obvious edema/opacities

## 2024-01-31 NOTE — ED NOTES
Med rec updated and complete. Allergies reviewed.   Interviewed family ( wife). Pt  takes no medications.      Home pharmacy    [facility] = [phone]

## 2024-01-31 NOTE — ED NOTES
Received phone call from transfer center - pt may be going to [facility]. Provided transfer center with wife's phone number and requested her to be updated.     Bedside report to [facility] RNMaico.     Called [facility] RN to update on Levo titration. Requested BP and HR parameters per CT surgeon.     Called [facility] transfer center - verified pt will be transferring to [facility].     Received call from [facility] transfer center RN - CT surgeon wants HR < 80 and SBP <110.    Maico VERDE updated on parameters.     Pt discharged with [facility].

## 2024-01-31 NOTE — DISCHARGE PLANNING
Medical Social Work    Referral: Acute Medical Patient    Intervention: Pt is a 73 year old male brought in by [employer] from a bar.  Pt is Onofre Mccormack (: 1950).  Medics state that they did call pt's wife and update her that they were bringing pt to [facility].  Per chart pt's wife Jodie can be reached at: [phone].    Plan: SW will follow as needed.

## 2024-01-31 NOTE — ASSESSMENT & PLAN NOTE
Bicarb 17  Likely related to hypotension from dissection/hemopericardium  Serial BMP  Bicarb infusion to maintain as normal pH as possible until surgery can correct his underlying problems  Monitor urine output

## 2024-01-31 NOTE — ASSESSMENT & PLAN NOTE
Suspect secondary to hemopericardium from aortic dissection  Actively titrating norepinephrine  Also started on vasopressin, will wean that off first  EKG not consistent with acute MI  Patient does not appear hypervolemic, will trend CVP  Echocardiogram noted  Not a candidate for pericardiocentesis at this time per cardiology  Goal BP , with heart rate 60-80 if able  Maintain euvolemia with IV fluids, goal CVP 12-15 range after review with [facility]/cardiology

## 2024-01-31 NOTE — CONSULTS
Cardiology Initial Consult Note    Date of note:    1/31/2024      Consulting Physician: Jodie Alcala D.O.    Patient ID:    Name:   Onofre Mccormack   YOB: 1950  Age:   73 y.o.  male   MRN:   [id number]      Reason for Consultation: pericardial effusion    HPI:  Onofre Mccormack is a 73 y.o.-year-old male with a history of endovascular repair of infrarenal abdominal aortic aneurysm using a   [id number] [place] excluder endoprosthesis and placement of a right iliac artery extender cuff 27x10 cm in 2014, hypertension, current smoking who has been lost to medical follow-up until today when he had acute onset of syncope.     He was playing pool and had acute onset of dizziness followed by syncope. No antecedent palpitations, chest or back pain, or any other symptoms he can rememeber. HE received bystander CPR and had ROSC (unclear if he actually lost pulses). EMS found him awake, pale, and hypotensive. He was placed on vasopressin and levophed as well as given IV fluids with improvement in hypotension.     CT in the ER showed type A aortic dissection and likely hemopericardium. The CT was personally reviewed by me.      Stat echo was personally reviewed by me and showed an abdominal flap vs blood clot and what looked like a flap in the aortic arch as well as a severely aneurysmal aortic root. There was a significant pericardial effusion with hemodynamic compromise and consistent with severely elevated right atrial pressure.    Patient denies antecedent chest pain, palpitations, dyspnea on exertion, lower extremity swelling, orthopnea, PND or recent weight gain.    He did have a URI 3 weeks ago. He currently has pleuritic chest pain.        ROS  Constitution: Negative for chills, fever and night sweats.   HENT: Negative for nosebleeds.    Eyes: Negative for vision loss in left eye and vision loss in right eye.   Respiratory: Negative for hemoptysis.    Gastrointestinal: Negative for hematemesis,  hematochezia and melena.   Genitourinary: Negative for hematuria.   Neurological: Negative for focal weakness, numbness and paresthesias.      All others reviewed and negative.      Past Medical History:   Diagnosis Date    AAA (abdominal aortic aneurysm) (HCC)     Arthritis     back    Dental disorder     upper dentures    [place] (hard of hearing)     Pneumonia 04/01/2015    Snoring        Past Surgical History:   Procedure Laterality Date    FUSION, SPINE, LUMBAR, PLIF Right 10/5/2015    Procedure: LUMBAR FUSION POSTERIOR L4-5 XLIF, L5-S1 MINI OPEN TLIF;  Surgeon: Forrest Smalls M.D.;  Location: SURGERY [facility];  Service:     AAA WITH STENT GRAFT  11/19/2014    Performed by Seth Calero M.D. at SURGERY [facility]    OTHER ABDOMINAL SURGERY  1980's    hernia    OTHER      epidurals for back pain /triggerpoint injs         (Not in a hospital admission)    Current Facility-Administered Medications   Medication Dose Route Frequency Provider Last Rate Last Admin    sodium bicarbonate 150 mEq in D5W infusion (premix)   Intravenous Continuous Costa Dupree M.D.        norepinephrine (Levophed) 8 mg in 250 mL NS infusion (premix)  0-1 mcg/kg/min (Ideal) Intravenous Continuous RONALD PaulO. 41.1 mL/hr at 01/31/24 0051 0.3 mcg/kg/min at 01/31/24 0051    vasopressin (Vasostrict) 20 Units in  mL Infusion  0.03 Units/min Intravenous Continuous RONALD PaulO. 9 mL/hr at 01/30/24 2230 0.03 Units/min at 01/30/24 2230     No current outpatient medications on file.         Allergies   Allergen Reactions    Penicillins Hives and Itching     Unknown as a child         History reviewed. No pertinent family history.      Social History     Socioeconomic History    Marital status:      Spouse name: Not on file    Number of children: Not on file    Years of education: Not on file    Highest education level: Not on file   Occupational History    Not on file   Tobacco Use    Smoking status: Every  "Day     Current packs/day: 0.25     Average packs/day: 0.3 packs/day for 40.0 years (10.0 ttl pk-yrs)     Types: Cigarettes    Smokeless tobacco: Never   Vaping Use    Vaping Use: Never used   Substance and Sexual Activity    Alcohol use: No    Drug use: No    Sexual activity: Not on file   Other Topics Concern    Not on file   Social History Narrative    Not on file     Social Determinants of Health     Financial Resource Strain: Not on file   Food Insecurity: Not on file   Transportation Needs: Not on file   Physical Activity: Not on file   Stress: Not on file   Social Connections: Not on file   Intimate Partner Violence: Not on file   Housing Stability: Not on file         Physical Exam  Body mass index is 30.85 kg/m².  /67   Pulse 79   Temp (!) 33.4 °C (92.1 °F) (Core)   Resp (!) 27   Ht 1.778 m (5' 10\")   Wt 97.5 kg (215 lb)   SpO2 96%   Vitals:    01/31/24 0030 01/31/24 0045 01/31/24 0100 01/31/24 0115   BP: 108/82 96/69 101/69 106/67   Pulse: 80 82 83 79   Resp: (!) 23 (!) 23 (!) 26 (!) 27   Temp:       TempSrc:       SpO2: 97% 97% 98% 96%   Weight:       Height:         Oxygen Therapy:  Pulse Oximetry: 96 %, O2 (LPM): 10, O2 Delivery Device: Oxymask    General: Sleepy  Eyes: nl conjunctiva  ENT: OP clear  Neck: JVP elevated, no carotid bruits  Lungs: normal respiratory effort, CTAB  Heart: RRR, no murmurs, + S4 gallop, no rubs, no edema bilateral lower extremities. No LV/RV heave on cardiac palpatation. 2+ bilateral radial pulses.  Diminished bilateral dp pulses.   Abdomen: soft, non distended, no masses, normal bowel sounds.  No HSM.  Extremities/MSK: no clubbing, + cyanosis left and right UE fingertips  Neurological: No focal sensory deficits  Psychiatric: Appropriate affect, A/O x 3  Skin: Warm extremities    Labs (personally reviewed and notable for):   Lactate 4.3      Cardiac Imaging and Procedures Review:    EKG dated 1/30/2024: My personal interpretation is NSR, non-specific st " changes    Radiology test Review:  CXR: personally reviewed and showed widened mediastinum.      Op Note 11/2014:  PROCEDURE PERFORMED:  1.  Endovascular repair of infrarenal abdominal aortic aneurysm using a   [id number] [place] excluder endoprosthesis.  2.  Placement of a right iliac artery extender cuff 27x10 cm.  3.  Exposure of bilateral common femoral arteries for delivery of   endoprosthesis.  4.  Catheter placement, aorta x2.  5.  Aortogram.    CT Chest/A/P:  1.  Interval development of a [place] type a aortic dissection with maximal diameter of the aortic root measuring 6.4 x 7.4 cm and descending aorta measuring 7.0 x 6.5 cm. Dissection flap appears to extend to the level of the superior mesenteric artery   origin.  2.  Moderate-sized intermediate attenuation pericardial effusion likely representing hemopericardium.  3.  Severely diminished contrast opacification within the aorta below the level of the renal arteries which may related to cardiac output or flow limitation.  4.  Interval aneurysmal dilation of the abdominal aorta measuring 5.3 x 5.1 cm.  5.  Minimal if any contrast opacification in the celiac, superior mesenteric arteries and distal arterial structures.  6.  Increased bilateral common iliac artery aneurysm measuring 3.9 on the left and 3.2 cm on the right.  7.  Aortobiiliac stent graft in place.  8.  Large amount of intravenous air present in the right subclavian and brachiocephalic veins.      Head CT - neg.       Impression and Medical Decision Making:  # Acute Type A aortic Dissection complicated by hemopericardium and cardiogenic shock. Less likely Aortic Aneurysm without dissection and hemopericardium is from CPR.   # History of endovascular repair of infrarenal abdominal aortic aneurysm using a   [id number] [place] excluder endoprosthesis and placement of a right iliac artery extender cuff 27x10 cm in 2014  # Current smoking  # Untreated hypertension      Recommendations:  # Discussed case at  length with Dr. Brower, Dr. Dupree, and Dr. Tiffany Childress. Given the likelihood the hemopericardium is from a dissection, percutaneous pericardiocentensis could be instantly fatal and hence contraindicated without surgical back-up and in the operating room. Given the complexicity of the dissection, We did opt for the safest route being transfer to a quaternary care facility which Dr. Dupree graciously is helping to arrange.   # I did discuss one option is pericardiocentensis in the OR right before induction and sternotomy as well as CISCO to confirm diagnosis. Unfortunately the CT scan we have was not protocoled to be an aortic study and this there could be some artifact complicating the diagnosis.   # due to cardiogenic shock, holding BB and CCBs. Could start nitroglycerin gtt for afterload reduction to keep SBP <100  # continue levophed and/or epi for inotropy. goal MAP >60.   # wean vasopressin  # would shoot for CVP goal of 12-15mmHg.   # agree with emergency transfer. Critical ill patient.     Discussed at length with his wife the severity of condition and reasoning for transfer. She was overwhelmed, but understanding.       Thank you for allowing me to participate in the care of this patient, Cardiology will sign off. Please do not hesitate to call, however, if we can be of any further assistance.       Cruz Cooper MD  Cardiologist, [facility] [place]   [phone]

## 2024-01-31 NOTE — DISCHARGE PLANNING
Per PM SW request, SW called pts wife to ensure she was called by someone once pt left, per chart review SW read that RN provided wife's name and number to transfer center.    Pts wife aware that pt had left and had been called by someone in the ambulance.

## 2024-01-31 NOTE — ED PROVIDER NOTES
ER Provider Note    Scribed for Dr. Jodie Alcala D.O. by Duke Porter. 1/30/2024  8:48 PM    Primary Care Provider: Reji Johnson M.D.    CHIEF COMPLAINT  Chief Complaint   Patient presents with    Syncope     Brought in by xavi from a bar. Per report patient had a syncopal episode while playing pool. Hit head on the concrete and turned unresponsive - Bystander did CPR. EMS arrival patient awake and responding, pale and hypotensive.      EXTERNAL RECORDS REVIEWED  Inpatient Notes Patient was seen on 10/7/2015 for admission for Lumbar Fusion     HPI/ROS    LIMITATION TO HISTORY   Select: Altered mental status / Confusion  OUTSIDE HISTORIAN(S):  Significant other Wife was present at bedside to provide details.     Onofre Mccormack is a 73 y.o. male who presents to the ED via ambulance for evaluation of syncopal episode and hypotension onset prior to arrival. Per EMS, the patient was at the bar playing pool when he had an episode of syncope and had a ground level fall onto concrete. The patient struck his head.  Positive LOC, he had CPR performed on him. He regained consciousness and was brought into the ED. EMS reports 60/30 for his blood pressure. They gave him fluids but it did not return to normal levels. His sugar was at 140. The wife reported the patient had a headache in the morning but ignored it. While at the bar, he took a pill that was given by someone at the pool house for his headache. He denies drinking today. He adds that he has severe chest pain. The patient denies history of heart problems, diabetes, ulcers or high blood pressure. The wife adds that the patient tends to have low blood pressure, normally around 120/80,  but takes no medications for it.  Patient states that he has 3 stents for his abdominal aortic aneurysm in his legs.  He denies severe headache at this time.    PAST MEDICAL HISTORY  Past Medical History:   Diagnosis Date    AAA (abdominal aortic aneurysm) (HCC)     Arthritis      back    Dental disorder     upper dentures    Pneumonia 04/2015    Snoring        SURGICAL HISTORY  Past Surgical History:   Procedure Laterality Date    FUSION, SPINE, LUMBAR, PLIF Right 10/5/2015    Procedure: LUMBAR FUSION POSTERIOR L4-5 XLIF, L5-S1 MINI OPEN TLIF;  Surgeon: Forrest Smalls M.D.;  Location: SURGERY [facility];  Service:     AAA WITH STENT GRAFT  11/19/2014    Performed by Seth Calero M.D. at SURGERY [facility]    OTHER ABDOMINAL SURGERY  1980's    hernia    OTHER      epidurals for back pain /triggerpoint injs       FAMILY HISTORY  No family history noted.    SOCIAL HISTORY   reports that he has been smoking cigarettes. He has a 10.0 pack-year smoking history. He has never used smokeless tobacco. He reports that he does not drink alcohol and does not use drugs.    CURRENT MEDICATIONS  Previous Medications    HYDROCODONE-ACETAMINOPHEN (NORCO) 5-325 MG TAB PER TABLET    Take 1-2 Tabs by mouth every 6 hours as needed.       ALLERGIES  Penicillins    PHYSICAL EXAM  There were no vitals taken for this visit.  Constitutional: Patient is a very ill-appearing male in severe distress, altered mental status, moaning, writhing around, diaphoretic and cool  HENT: Normocephalic, atraumatic. Dry oral mucosa, no oral, facial or dental trauma noted.  Eyes: PERRL, EOMI, Conjunctiva without erythema , pupils are pinpoint and equal bilaterally.   Cardiovascular: Normal heart rate and Regular rhythm. No murmur,   Thorax & Lungs: Labored breathing, no rhonchi, wheezing or rales. Periods of apnea with diminished breath sounds.  Abdomen: Bowel sounds normal in all four quadrants. Obese, Soft,nontender, no rebound , guarding, palpable masses.  Contusion noted to the right upper quadrant.  : Incontinent of urine, normal genitalia  Skin: Pale, cool, and diaphoretic, 4 cm contusion to mid sternum, multiple linear contusions to right upper quadrant.   Extremities: Peripheral pulses 4/4 No edema, No  tenderness, peripheral extremities are cool to touch, no cyanosis  Neurologic: Alert & oriented x 3, Normal motor function, Normal sensory function, No lateralizing or focal deficits noted. DTR's 4/4 bilaterally. Moves all four extremities, awake and alert with verbal stimulus and then becomes extremely drowsy and somnolent.        DIAGNOSTIC STUDIES & PROCEDURES    Labs:   Results for orders placed or performed during the hospital encounter of 01/30/24   CMP   Result Value Ref Range    Sodium 139 135 - 145 mmol/L    Potassium 3.8 3.6 - 5.5 mmol/L    Chloride 108 96 - 112 mmol/L    Co2 17 (L) 20 - 33 mmol/L    Anion Gap 14.0 7.0 - 16.0    Glucose 203 (H) 65 - 99 mg/dL    Bun 16 8 - 22 mg/dL    Creatinine 0.89 0.50 - 1.40 mg/dL    Calcium 8.4 (L) 8.5 - 10.5 mg/dL    Correct Calcium 8.6 8.5 - 10.5 mg/dL    AST(SGOT) 18 12 - 45 U/L    ALT(SGPT) 14 2 - 50 U/L    Alkaline Phosphatase 80 30 - 99 U/L    Total Bilirubin 0.3 0.1 - 1.5 mg/dL    Albumin 3.8 3.2 - 4.9 g/dL    Total Protein 6.5 6.0 - 8.2 g/dL    Globulin 2.7 1.9 - 3.5 g/dL    A-G Ratio 1.4 g/dL   CBC WITH DIFFERENTIAL   Result Value Ref Range    WBC 8.5 4.8 - 10.8 K/uL    RBC 5.08 4.70 - 6.10 M/uL    Hemoglobin 14.8 14.0 - 18.0 g/dL    Hematocrit 46.4 42.0 - 52.0 %    MCV 91.3 81.4 - 97.8 fL    MCH 29.1 27.0 - 33.0 pg    MCHC 31.9 (L) 32.3 - 36.5 g/dL    RDW 50.4 (H) 35.9 - 50.0 fL    Platelet Count 195 164 - 446 K/uL    MPV 9.7 9.0 - 12.9 fL    Neutrophils-Polys 54.70 44.00 - 72.00 %    Lymphocytes 31.40 22.00 - 41.00 %    Monocytes 9.90 0.00 - 13.40 %    Eosinophils 1.90 0.00 - 6.90 %    Basophils 0.60 0.00 - 1.80 %    Immature Granulocytes 1.50 (H) 0.00 - 0.90 %    Nucleated RBC 0.20 0.00 - 0.20 /100 WBC    Neutrophils (Absolute) 4.64 1.82 - 7.42 K/uL    Lymphs (Absolute) 2.66 1.00 - 4.80 K/uL    Monos (Absolute) 0.84 0.00 - 0.85 K/uL    Eos (Absolute) 0.16 0.00 - 0.51 K/uL    Baso (Absolute) 0.05 0.00 - 0.12 K/uL    Immature Granulocytes (abs) 0.13 (H) 0.00  - 0.11 K/uL    NRBC (Absolute) 0.02 K/uL   PT/INR   Result Value Ref Range    PT 15.6 (H) 12.0 - 14.6 sec    INR 1.23 (H) 0.87 - 1.13   PTT   Result Value Ref Range    APTT 26.4 24.7 - 36.0 sec   proBrain Natriuretic Peptide, NT   Result Value Ref Range    NT-proBNP 272 (H) 0 - 125 pg/mL   URINE DRUG SCREEN   Result Value Ref Range    Amphetamines Urine Negative Negative    Barbiturates Negative Negative    Benzodiazepines Negative Negative    Cocaine Metabolite Negative Negative    Fentanyl, Urine Negative Negative    Methadone Negative Negative    Opiates Negative Negative    Oxycodone Positive (A) Negative    Phencyclidine -Pcp Negative Negative    Propoxyphene Negative Negative    Cannabinoid Metab Negative Negative   TROPONIN   Result Value Ref Range    Troponin T 11 6 - 19 ng/L   DIAGNOSTIC ALCOHOL   Result Value Ref Range    Diagnostic Alcohol <10.1 <10.1 mg/dL   LACTIC ACID   Result Value Ref Range    Lactic Acid 4.3 (HH) 0.5 - 2.0 mmol/L   PLATELET MAPPING WITH BASIC TEG   Result Value Ref Range    Reaction Time Initial-R 3.7 (L) 4.6 - 9.1 min    React Time Initial Hep 4.0 (L) 4.3 - 8.3 min    Clot Kinetics-K 1.8 0.8 - 2.1 min    Clot Angle-Angle 70.6 63.0 - 78.0 degrees    Maximum Clot Strength-MA 53.0 52.0 - 69.0 mm    TEG Functional Fibrinogen(MA) 15.6 15.0 - 32.0 mm    % Inhibition ADP 40.0 (H) 0.0 - 17.0 %    % Inhibition AA 60.3 (H) 0.0 - 11.0 %    TEG Algorithm Link Algorithm    ESTIMATED GFR   Result Value Ref Range    GFR (CKD-EPI) 91 >60 mL/min/1.73 m 2   EKG   Result Value Ref Range    Report       [facility] Emergency Dept.    Test Date:  2024  Pt Name:    CECIL PHELPS               Department: ER  MRN:        [id number]                      Room:       RD 07  Gender:     Male                         Technician: [id number]  :        1950                   Requested By:ER TRIAGE PROTOCOL  Order #:    [id number]                    Reading MD:    Measurements  Intervals                                 Axis  Rate:       75                           P:          45  MS:         154                          QRS:        64  QRSD:       96                           T:          19  QT:         401  QTc:        448    Interpretive Statements  Sinus rhythm  Compared to ECG 08/16/2022 21:31:22  T-wave abnormality no longer present        All labs reviewed by me.    EKG:   I have independently interpreted this EKG     Radiology:   The attending Emergency Physician has independently interpreted the diagnostic imaging associated with this visit and is awaiting the final reading from the radiologist, which will be displayed below.    Preliminary interpretation is a follows: C-spine shows degenerative changes CT head shows no intracranial bleed and CT chest abdomen and pelvis was read by the radiologist as I do not read these.  Radiologist interpretation:    CT-CSPINE WITHOUT PLUS RECONS   Final Result      Degenerative changes of the cervical spine without acute fracture or malalignment.      CT-HEAD W/O   Final Result      No acute intracranial abnormality.         CT-CHEST,ABDOMEN,PELVIS WITH   Final Result      1.  Interval development of a Arvilla type a aortic dissection with maximal diameter of the aortic root measuring 6.4 x 7.4 cm and descending aorta measuring 7.0 x 6.5 cm. Dissection flap appears to extend to the level of the superior mesenteric artery    origin.   2.  Moderate-sized intermediate attenuation pericardial effusion likely representing hemopericardium.   3.  Severely diminished contrast opacification within the aorta below the level of the renal arteries which may related to cardiac output or flow limitation.   4.  Interval aneurysmal dilation of the abdominal aorta measuring 5.3 x 5.1 cm.   5.  Minimal if any contrast opacification in the celiac, superior mesenteric arteries and distal arterial structures.   6.  Increased bilateral common iliac artery aneurysm measuring  3.9 on the left and 3.2 cm on the right.   7.  Aortobiiliac stent graft in place.   8.  Large amount of intravenous air present in the right subclavian and brachiocephalic veins.         Findings were conveyed to Dr. BELINDA SANDOVAL on 1/30/2024 10:06 PM.      DX-CHEST-PORTABLE (1 VIEW)   Final Result      Mild enlargement of the cardiomediastinal silhouette without acute cardiopulmonary abnormality.         COURSE & MEDICAL DECISION MAKING    ED Observation Status? No; Patient does not meet criteria for ED Observation.     INITIAL ASSESSMENT AND PLAN  Care Narrative:       8:48 PM - Patient seen and evaluated at bedside. Discussed plan of care, including labs and imaging. Patient agrees to plan of care. Patient will be treated with iohexol 350 mg/mL IV, Narcan injection 3 mg, Vasopressin 20 units, Levophed 8 mg in 250 mL NA infusion, Bolus 0.9% infusion Narcan injection 2 mg for his symptoms. Ordered DX-Chest, CT-Chest Abdomen pelvis, CT-Head, CT Spine without Plus Recons, Platelet Mapping with Basic TEG, Lactic Acid, PTT, proBNP, Urine drug screen, Troponin, Diagnostic alcohol,CMP, CBC with diff, PT/INR to evaluate. Differential diagnoses include but are not limited to: aortic dissection vs drug overdose vs. endocranial bleed vs. Sepsis Vs. STEMI    Patient responded somewhat to the Narcan, he was much more alert but continues to be hypotensive.  I gave him a second dose of Narcan which did not make any significant change.  His laboratories showed a normal white blood cell count with a stable H&H.  Urinalysis is positive for oxycodone but alcohol level was 0, BNP was 272 H&H is stable and his electrolytes are unremarkable.  Patient continues to remain hypotensive.  He was given 2 L of normal saline and we titrated the Levophed drip until we were able to obtain the blood pressure above 90.  At that point we opted to take the patient via gurney to CT where he was noted to have what appears to be a thoracic aortic  dissection.  I immediately paged cardiothoracic surgery and Dr. Childress will be in to evaluate the patient.    10:10 PM- Patient was reevaluated at bedside. Discussed lab and radiology results with the patient. Dr. Childress (Cardiothoracic Surgery) is coming to see the patient. She wants the blood pressure to be in the 100s systolic.     10:28 PM- The patient is currently 120 so we stopped Vasopressin and norepinephrine is decreased. The patient is complaining of being cold.     11:31 PM I discussed the patient's case and the above findings with Dr. Dupree (Intensivist) who will assess the patient for hospitalization. Spoke again with Dr. Childress, she will not take the patient for immediate surgery. Instead they plan for ECHO and repeat CT tomorrow.     HYDRATION: Based on the patient's presentation of Hypotension the patient was given IV fluids. IV Hydration was used because oral hydration was not adequate alone. Upon recheck following hydration, the patient was improved.    Patient's echocardiogram was done and was noted to have a pericardial effusion as well as a flap that was noted.  This was discussed between cardiology, CT surgery, intensivist and radiology and it has been decided that this patient would not be a good candidate to stay at [facility] and will require transfer elsewhere.  Transfer team will work on referrals out.    1:30 AM - Patient is going to be transferred to either [place] or [facility] for higher level of care. Dr. Dupree is at bedside performing central and arterial line.     3:00am case management has told me that [facility] has accepted the patient for transfer but because of foul weather on [facility] this will delay transfer.  Patient has been resting comfortably, blood pressure is stable per the thoracic surgeons request.  He is still on norepinephrine but the vasopressin is off.  He has received bicarbonate drip with 150 mcg of bicarbonate at 75 cc/h.    ADDITIONAL PROBLEM  LIST AND DISPOSITION  Abdominal aortic aneurysm               DISPOSITION AND DISCUSSIONS  I have discussed management of the patient with the following physicians and EMA's: Dr. Childress (Cardiothoracic Surgery), Dr. Dupree (Intensivist)    Discussion of management with other [facility] [place] or appropriate source(s): Pharmacy Bear at bedside and Radiologist consulted about patient's CT results      Barriers to care at this time, including but not limited to: None    Decision tools and prescription drugs considered including, but not limited to:  Fluids, blood pressure management, transfer to higher level of care when available. .    CRITICAL CARE  The very real possibilty of a deterioration of this patient's condition required the highest level of my preparedness for sudden, emergent intervention.  I provided critical care services, which included medication orders, frequent reevaluations of the patient's condition and response to treatment, ordering and reviewing test results, and discussing the case with various consultants.  The critical care time associated with the care of the patient was 75 minutes. Review chart for interventions. This time is exclusive of any other billable procedures.    DISPOSITION:  Patient will be transferred to outside facility in critical condition.     FINAL IMPRESSION   1. 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 Syncope, unspecified syncope type    3. Hypotension, unspecified hypotension type    4. Hypoxia    5. History of abdominal aortic aneurysm    6. Critical Care Time 75 minutes     Duke ANTHONY ([employer]), am scribing for, and in the presence of, Jodie Alcala D.O..    Electronically signed by: Duke Sen), 1/30/2024    Jodie ANTHONY D.O. personally performed the services described in this documentation, as scribed by Duke Porter in my presence, and it is both accurate and complete.    The note accurately reflects work and decisions made by me.  Jodie Alcala D.O.   1/31/2024  3:51 AM

## 2024-01-31 NOTE — ED NOTES
Bedside report from MEHREEN Iyer.     Fall risk: Yes  Interventions in place: Move the patient closer to the nurse's station, Keep floor surfaces clean and dry, segura in place.   Continuous monitoring: Yes - SR  IVF/IV medications: Yes     - Levo at 0.2 mcg/kg/min   - Sodium Bicarb at 42 ml/hr  Oxygen: Yes - 10L oxymask  Isolation: No    Patient updated on POC. Call light within reach.

## 2024-01-31 NOTE — ED TRIAGE NOTES
Onofre Mccormack  73 y.o.  male  Chief Complaint   Patient presents with    Syncope     Brought in by xavi from a bar. Per report patient had a syncopal episode while playing pool. Hit head on the concrete and turned unresponsive - Bystander did CPR. EMS arrival patient awake and responding, pale and hypotensive.

## 2024-01-31 NOTE — PROCEDURES
Central Line Insertion    Date/Time: 1/31/2024 2:13 AM    Performed by: Costa Dupree M.D.  Authorized by: Costa Dupree M.D.    Consent:     Consent obtained:  Verbal    Consent given by:  Patient    Risks discussed:  Arterial puncture, incorrect placement, pneumothorax, infection and bleeding    Alternatives discussed:  Delayed treatment  Pre-procedure details:     Hand hygiene: Hand hygiene performed prior to insertion      Sterile barrier technique: All elements of maximal sterile technique followed      Skin preparation:  ChloraPrep and povidone-iodine  Sedation:     Sedation type:  None  Anesthesia:     Anesthesia method:  Local infiltration    Local anesthetic:  Lidocaine 1% w/o epi  Procedure details:     Location:  R internal jugular    Patient position:  Trendelenburg    Procedural supplies:  Triple lumen    Catheter size:  7 Fr    Landmarks identified: yes      Ultrasound guidance: yes      Sterile ultrasound techniques: Sterile gel and sterile probe covers were used      Number of attempts:  1    Successful placement: yes    Post-procedure details:     Post-procedure:  Dressing applied and line sutured    Guidewire: guidewire removal confirmed      Assessment:  Blood return through all ports and placement verified by x-ray    Patient tolerance of procedure:  Tolerated well, no immediate complications

## 2024-01-31 NOTE — DISCHARGE PLANNING
Medical Social Work    MSW received a call from [facility] regarding transfer.  PCS filled out and faxed to [facility].  Disc was requested and will be picked up once ready.  COBRA and transfer packet started; need receiving facility information and transport information once available.    [id number] Disc picked up and placed in transfer packet.

## 2024-01-31 NOTE — ASSESSMENT & PLAN NOTE
Presumably secondary to dissection/hemopericardium  EKG without acute STEMI like changes  No history of prior syncope  Appears euvolemic   Continue cardiac monitoring  Optimize electrolytes

## 2024-01-31 NOTE — DISCHARGE SUMMARY
"  ED Observation Discharge Summary    Patient:Onofre Mccormack  Patient : 1950  Patient MRN: [id number]  Patient PCP: Reji Johnson M.D.    Admit Date: 2024  Discharge Date and Time: 24 7:36 AM  Discharge Diagnosis:   1. Hypotension, unspecified hypotension type    2. 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 Syncope, unspecified syncope type    4. Hypoxia    5. History of abdominal aortic aneurysm        Discharge Attending: Seth Mckeon M.D.  Discharge Service: ED Observation    ED Course  Onofre is a 73 y.o. male who was evaluated at [facility] where patient was found to have a large aortic dissection with associated rupture pericardial effusion and questionable occlusion of distal aorta, celiac artery.  Patient was emergently seen by cardiothoracic surgery who believes that distal perfusion is persistent.  Goal blood pressure SBP in the 90-100s and patient did not require pressors.  Patient repeat CT failed to reveal significant interval worsening of the pericardial effusion.  Patient  was accepted by [facility] and upon signout was awaiting transfer to their facility.  Patient without any significant change throughout my shift.    Discharge Exam:  BP (!) 118/92   Pulse 78   Temp 37 °C (98.6 °F) (Bladder)   Resp (!) 21   Ht 1.778 m (5' 10\")   Wt 97.5 kg (215 lb)   SpO2 98%   BMI 30.85 kg/m² .    Constitutional: Awake and alert. Nontoxic  HENT:  Grossly normal  Eyes: Grossly normal  Neck: Normal range of motion  Cardiovascular: Normal heart rate   Thorax & Lungs: No respiratory distress  Abdomen: Nontender  Skin:  No pathologic rash.   Extremities: Well perfused  Psychiatric: Affect normal    Labs  Results for orders placed or performed during the hospital encounter of 24   EC-ECHOCARDIOGRAM COMPLETE W/O CONT   Result Value Ref Range    Left Ventrical Ejection Fraction 45    CMP   Result Value Ref Range    Sodium 139 135 - 145 mmol/L    " Potassium 3.8 3.6 - 5.5 mmol/L    Chloride 108 96 - 112 mmol/L    Co2 17 (L) 20 - 33 mmol/L    Anion Gap 14.0 7.0 - 16.0    Glucose 203 (H) 65 - 99 mg/dL    Bun 16 8 - 22 mg/dL    Creatinine 0.89 0.50 - 1.40 mg/dL    Calcium 8.4 (L) 8.5 - 10.5 mg/dL    Correct Calcium 8.6 8.5 - 10.5 mg/dL    AST(SGOT) 18 12 - 45 U/L    ALT(SGPT) 14 2 - 50 U/L    Alkaline Phosphatase 80 30 - 99 U/L    Total Bilirubin 0.3 0.1 - 1.5 mg/dL    Albumin 3.8 3.2 - 4.9 g/dL    Total Protein 6.5 6.0 - 8.2 g/dL    Globulin 2.7 1.9 - 3.5 g/dL    A-G Ratio 1.4 g/dL   CBC WITH DIFFERENTIAL   Result Value Ref Range    WBC 8.5 4.8 - 10.8 K/uL    RBC 5.08 4.70 - 6.10 M/uL    Hemoglobin 14.8 14.0 - 18.0 g/dL    Hematocrit 46.4 42.0 - 52.0 %    MCV 91.3 81.4 - 97.8 fL    MCH 29.1 27.0 - 33.0 pg    MCHC 31.9 (L) 32.3 - 36.5 g/dL    RDW 50.4 (H) 35.9 - 50.0 fL    Platelet Count 195 164 - 446 K/uL    MPV 9.7 9.0 - 12.9 fL    Neutrophils-Polys 54.70 44.00 - 72.00 %    Lymphocytes 31.40 22.00 - 41.00 %    Monocytes 9.90 0.00 - 13.40 %    Eosinophils 1.90 0.00 - 6.90 %    Basophils 0.60 0.00 - 1.80 %    Immature Granulocytes 1.50 (H) 0.00 - 0.90 %    Nucleated RBC 0.20 0.00 - 0.20 /100 WBC    Neutrophils (Absolute) 4.64 1.82 - 7.42 K/uL    Lymphs (Absolute) 2.66 1.00 - 4.80 K/uL    Monos (Absolute) 0.84 0.00 - 0.85 K/uL    Eos (Absolute) 0.16 0.00 - 0.51 K/uL    Baso (Absolute) 0.05 0.00 - 0.12 K/uL    Immature Granulocytes (abs) 0.13 (H) 0.00 - 0.11 K/uL    NRBC (Absolute) 0.02 K/uL   PT/INR   Result Value Ref Range    PT 15.6 (H) 12.0 - 14.6 sec    INR 1.23 (H) 0.87 - 1.13   PTT   Result Value Ref Range    APTT 26.4 24.7 - 36.0 sec   proBrain Natriuretic Peptide, NT   Result Value Ref Range    NT-proBNP 272 (H) 0 - 125 pg/mL   URINE DRUG SCREEN   Result Value Ref Range    Amphetamines Urine Negative Negative    Barbiturates Negative Negative    Benzodiazepines Negative Negative    Cocaine Metabolite Negative Negative    Fentanyl, Urine Negative Negative     Methadone Negative Negative    Opiates Negative Negative    Oxycodone Positive (A) Negative    Phencyclidine -Pcp Negative Negative    Propoxyphene Negative Negative    Cannabinoid Metab Negative Negative   TROPONIN   Result Value Ref Range    Troponin T 11 6 - 19 ng/L   DIAGNOSTIC ALCOHOL   Result Value Ref Range    Diagnostic Alcohol <10.1 <10.1 mg/dL   LACTIC ACID   Result Value Ref Range    Lactic Acid 4.3 (HH) 0.5 - 2.0 mmol/L   PLATELET MAPPING WITH BASIC TEG   Result Value Ref Range    Reaction Time Initial-R 3.7 (L) 4.6 - 9.1 min    React Time Initial Hep 4.0 (L) 4.3 - 8.3 min    Clot Kinetics-K 1.8 0.8 - 2.1 min    Clot Angle-Angle 70.6 63.0 - 78.0 degrees    Maximum Clot Strength-MA 53.0 52.0 - 69.0 mm    TEG Functional Fibrinogen(MA) 15.6 15.0 - 32.0 mm    % Inhibition ADP 40.0 (H) 0.0 - 17.0 %    % Inhibition AA 60.3 (H) 0.0 - 11.0 %    TEG Algorithm Link Algorithm    ESTIMATED GFR   Result Value Ref Range    GFR (CKD-EPI) 91 >60 mL/min/1.73 m 2   Comp Metabolic Panel   Result Value Ref Range    Sodium 137 135 - 145 mmol/L    Potassium 5.4 3.6 - 5.5 mmol/L    Chloride 104 96 - 112 mmol/L    Co2 20 20 - 33 mmol/L    Anion Gap 13.0 7.0 - 16.0    Glucose 220 (H) 65 - 99 mg/dL    Bun 23 (H) 8 - 22 mg/dL    Creatinine 1.51 (H) 0.50 - 1.40 mg/dL    Calcium 8.1 (L) 8.5 - 10.5 mg/dL    Correct Calcium 8.1 (L) 8.5 - 10.5 mg/dL    AST(SGOT) 96 (H) 12 - 45 U/L    ALT(SGPT) 96 (H) 2 - 50 U/L    Alkaline Phosphatase 105 (H) 30 - 99 U/L    Total Bilirubin 1.3 0.1 - 1.5 mg/dL    Albumin 4.0 3.2 - 4.9 g/dL    Total Protein 6.4 6.0 - 8.2 g/dL    Globulin 2.4 1.9 - 3.5 g/dL    A-G Ratio 1.7 g/dL   PLATELET MAPPING WITH BASIC TEG   Result Value Ref Range    Reaction Time Initial-R 4.7 4.6 - 9.1 min    React Time Initial Hep 5.0 4.3 - 8.3 min    Clot Kinetics-K 2.3 (H) 0.8 - 2.1 min    Clot Angle-Angle 64.9 63.0 - 78.0 degrees    Maximum Clot Strength-MA 50.3 (L) 52.0 - 69.0 mm    TEG Functional Fibrinogen(MA) 12.1 (L)  15.0 - 32.0 mm    Lysis 30 minutes-LY30 0.0 0.0 - 2.6 %    % Inhibition ADP 43.1 (H) 0.0 - 17.0 %    % Inhibition AA 28.9 (H) 0.0 - 11.0 %    TEG Algorithm Link Algorithm    ESTIMATED GFR   Result Value Ref Range    GFR (CKD-EPI) 49 (A) >60 mL/min/1.73 m 2   EKG   Result Value Ref Range    Report       [facility] Emergency Dept.    Test Date:  2024  Pt Name:    CECIL PHELPS               Department: ER  MRN:        [id number]                      Room:       RD 07  Gender:     Male                         Technician: [id number]  :        1950                   Requested By:ER TRIAGE PROTOCOL  Order #:    [id number]                    Reading MD:    Measurements  Intervals                                Axis  Rate:       75                           P:          45  CA:         154                          QRS:        64  QRSD:       96                           T:          19  QT:         401  QTc:        448    Interpretive Statements  Sinus rhythm  Compared to ECG 2022 21:31:22  T-wave abnormality no longer present     POC CoV-2, FLU A/B, RSV by PCR   Result Value Ref Range    POC Influenza A RNA, PCR Negative Negative    POC Influenza B RNA, PCR Negative Negative    POC RSV, by PCR Negative Negative    POC SARS-CoV-2, PCR NotDetected        Radiology  CT-CTA COMPLETE THORACOABDOMINAL AORTA   Final Result         1. Stable thoracoabdominal aneurysm dissection with rupture. There is stable moderate hemopericardium.   2. 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   3. There is new occlusion of the mid and distal portions of the aortobiiliac stent graft and iliac arteries.   4. Dense nephrograms/ kidneys, suggesting possibility of acute tubular necrosis related to the acute aortic syndrome.   5. Trace ascites and trace right pleural effusion.   6. Mild emphysema.      Discussed with Dr. Seth Soares at 0656 hours.      DX-CHEST-FOR LINE PLACEMENT Perform procedure in: Patient's  Room   Final Result      1.  Interval insertion of a central venous catheter which terminates with the tip projecting over the expected region of the mid to distal superior vena cava.   2.  Stable appearance of the cardiomediastinal silhouette.   3.  Mild left basilar atelectasis.      EC-ECHOCARDIOGRAM COMPLETE W/O CONT   Final Result      CT-CSPINE WITHOUT PLUS RECONS   Final Result      Degenerative changes of the cervical spine without acute fracture or malalignment.      CT-HEAD W/O   Final Result      No acute intracranial abnormality.         CT-CHEST,ABDOMEN,PELVIS WITH   Final Result      1.  Interval development of a [place] type a aortic dissection with maximal diameter of the aortic root measuring 6.4 x 7.4 cm and descending aorta measuring 7.0 x 6.5 cm. Dissection flap appears to extend to the level of the superior mesenteric artery    origin.   2.  Moderate-sized intermediate attenuation pericardial effusion likely representing hemopericardium.   3.  Severely diminished contrast opacification within the aorta below the level of the renal arteries which may related to cardiac output or flow limitation.   4.  Interval aneurysmal dilation of the abdominal aorta measuring 5.3 x 5.1 cm.   5.  Minimal if any contrast opacification in the celiac, superior mesenteric arteries and distal arterial structures.   6.  Increased bilateral common iliac artery aneurysm measuring 3.9 on the left and 3.2 cm on the right.   7.  Aortobiiliac stent graft in place.   8.  Large amount of intravenous air present in the right subclavian and brachiocephalic veins.         Findings were conveyed to Dr. BELINDA SANDOVAL on 1/30/2024 10:06 PM.      DX-CHEST-PORTABLE (1 VIEW)   Final Result      Mild enlargement of the cardiomediastinal silhouette without acute cardiopulmonary abnormality.          Medications:   New Prescriptions    No medications on file       My final assessment includes   1. Hypotension, unspecified hypotension  type    2. 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 Syncope, unspecified syncope type    4. Hypoxia    5. History of abdominal aortic aneurysm        Upon Reevaluation, the patient's condition has: Remained critical but stable, patient transferred to outside facility    Patient discharged from ED Observation status at 0900 (Time) 01/31/24 (Date).     Total time spent on this ED Observation discharge encounter is > 30 Minutes    Electronically signed by: Seth Mckeon M.D., 1/31/2024 7:36 AM

## 2024-01-31 NOTE — CONSULTS
I,  Tiffany Childress MD performed a substantial portion of the service face-to-face with the same patient on the same date of service. I have reviewed and agree with the care plan and complexity of problems documented by Tiffany Childress MD and/or ALEIDA Huerta. I was personally involved in the medical decision making, including the information below:  reviewing and interpreting the films, conducted elements of the history and physical exam, and provided the plan of care. All medical decision making was made by me.           He was brought in by EMS  REFERRING PHYSICIAN: Jodie Alcala DO    CONSULTING PHYSICIAN: Tiffany Childress MD     CHIEF COMPLAINT: Syncope    HISTORY OF PRESENT ILLNESS: The patient is a 73 y.o. male with history of degenerative disk disorder s/p lumbar fusion, tobacco use and abdominal aortic aneurysm s/p stent graft (2014). [facility] after he has a syncopal episode while playing pool at a bar. It was reported he hit his head on the concrete and was unresponsive. Patient has history of falls with LOC and concussive symptoms with ED in 2022 and 2017. He was given an oxycodone pill earlier in the night and per patient he is very narcotic naive. He does not drink alcohol. He had bystander CPR. Pin-point pupils were noted. When EMS arrived he was awake and responding but hypotensive and on vasopressin gtt. CT head showed no acute abnormality. CTA C/A/P shows a very aneurysmal aorta including ascending arch and descending.   He is accompanied by his wife in the trauma bay. He states that there was nothing different about this episode than previous. He admits to being lost to follow up medically; not on any meds at home. Still smokes cigarettes every day.      PAST MEDICAL HISTORY:   Active Ambulatory Problems     Diagnosis Date Noted    Thoracic aneurysm, ruptured (HCC) 11/19/2014    Degenerative lumbar disc 10/05/2015     Resolved Ambulatory Problems     Diagnosis Date Noted     No Resolved Ambulatory Problems     Past Medical History:   Diagnosis Date    AAA (abdominal aortic aneurysm) (HCC)     Arthritis     Dental disorder     Pneumonia 04/2015    Snoring        PAST SURGICAL HISTORY:   Past Surgical History:   Procedure Laterality Date    FUSION, SPINE, LUMBAR, PLIF Right 10/5/2015    Procedure: LUMBAR FUSION POSTERIOR L4-5 XLIF, L5-S1 MINI OPEN TLIF;  Surgeon: Forrest Smalls M.D.;  Location: SURGERY [facility];  Service:     AAA WITH STENT GRAFT  11/19/2014    Performed by Seth Calero M.D. at SURGERY [facility]    OTHER ABDOMINAL SURGERY  1980's    hernia    OTHER      epidurals for back pain /triggerpoint injs        ALLERGIES:   Allergies   Allergen Reactions    Penicillins Hives and Itching     Unknown as a child        CURRENT MEDICATIONS:   Current Facility-Administered Medications:     norepinephrine (Levophed) 8 mg in 250 mL NS infusion (premix), 0-1 mcg/kg/min (Ideal), Intravenous, Continuous, Jodie Alcala D.O., Last Rate: 41.1 mL/hr at 01/30/24 2221, 0.3 mcg/kg/min at 01/30/24 2221    vasopressin (Vasostrict) 20 Units in  mL Infusion, 0.03 Units/min, Intravenous, Continuous, Jodie Alcala, D.O., Last Rate: 9 mL/hr at 01/30/24 2230, 0.03 Units/min at 01/30/24 2230    Current Outpatient Medications:     hydrocodone-acetaminophen (NORCO) 5-325 MG Tab per tablet, Take 1-2 Tabs by mouth every 6 hours as needed., Disp: 12 Tab, Rfl: 0    FAMILY HISTORY:   No history of aortic dissection     SOCIAL HISTORY:   Social History     Socioeconomic History    Marital status:      Spouse name: Not on file    Number of children: Not on file    Years of education: Not on file    Highest education level: Not on file   Occupational History    Not on file   Tobacco Use    Smoking status: Every Day     Current packs/day: 0.25     Average packs/day: 0.3 packs/day for 40.0 years (10.0 ttl pk-yrs)     Types: Cigarettes    Smokeless tobacco: Never   Vaping Use    Vaping  "Use: Never used   Substance and Sexual Activity    Alcohol use: No    Drug use: No    Sexual activity: Not on file   Other Topics Concern    Not on file   Social History Narrative    Not on file     Social Determinants of Health     Financial Resource Strain: Not on file   Food Insecurity: Not on file   Transportation Needs: Not on file   Physical Activity: Not on file   Stress: Not on file   Social Connections: Not on file   Intimate Partner Violence: Not on file   Housing Stability: Not on file     REVIEW OF SYSTEMS:  Review of Systems   Constitutional:  Positive for diaphoresis.   HENT: Negative.     Eyes: Negative.    Respiratory:  Positive for shortness of breath (baseline; believes its COPD). Negative for cough and hemoptysis.    Cardiovascular: Negative.  Negative for chest pain, palpitations and leg swelling.   Gastrointestinal: Negative.    Genitourinary: Negative.    Musculoskeletal: Negative.    Skin: Negative.    Neurological:  Positive for loss of consciousness.   Endo/Heme/Allergies: Negative.    Psychiatric/Behavioral: Negative.       PHYSICAL EXAMINATION:    BP (!) 84/62   Pulse 80   Temp (!) 33.4 °C (92.1 °F) (Core)   Resp (!) 28   Ht 1.778 m (5' 10\")   Wt 97.5 kg (215 lb)   SpO2 95%   BMI 30.85 kg/m²      Physical Exam  Vitals reviewed.   Constitutional:       General: He is not in acute distress.     Appearance: Normal appearance. He is obese. He is ill-appearing.   HENT:      Head: Normocephalic and atraumatic.      Right Ear: Hearing and external ear normal.      Left Ear: Hearing and external ear normal.      Nose: Nose normal. No congestion.      Mouth/Throat:      Dentition: Normal dentition.      Pharynx: Uvula midline.   Eyes:      General: No scleral icterus.     Extraocular Movements: Extraocular movements intact.      Conjunctiva/sclera: Conjunctivae normal.   Neck:      Thyroid: No thyromegaly.      Vascular: No JVD.      Trachea: Trachea normal.   Cardiovascular:      Rate and " Rhythm: Normal rate and regular rhythm.      Comments: On a pressor gtt  Pulmonary:      Effort: Pulmonary effort is normal. No respiratory distress.      Comments: On NRB face mask for oxygen  Abdominal:      General: There is no distension.      Palpations: Abdomen is soft.      Tenderness: There is no abdominal tenderness.   Musculoskeletal:         General: Normal range of motion.      Cervical back: Normal range of motion.   Skin:     General: Skin is warm and dry.      Coloration: Skin is not jaundiced.      Findings: No rash.   Neurological:      Mental Status: He is alert and oriented to person, place, and time.      Cranial Nerves: No cranial nerve deficit.      Sensory: Sensation is intact.      Motor: Motor function is intact.   Psychiatric:         Mood and Affect: Mood and affect normal.         Behavior: Behavior normal.         Cognition and Memory: Memory normal.         Judgment: Judgment normal.       LABS REVIEWED:  Lab Results   Component Value Date/Time    SODIUM 139 08/16/2022 09:20 PM    POTASSIUM 4.5 08/16/2022 09:20 PM    CHLORIDE 107 08/16/2022 09:20 PM    CO2 21 08/16/2022 09:20 PM    GLUCOSE 143 (H) 08/16/2022 09:20 PM    BUN 15 08/16/2022 09:20 PM    CREATININE 0.81 08/16/2022 09:20 PM      Lab Results   Component Value Date/Time    PROTHROMBTM 15.6 (H) 01/30/2024 08:53 PM    INR 1.23 (H) 01/30/2024 08:53 PM      Lab Results   Component Value Date/Time    WBC 8.5 01/30/2024 08:53 PM    RBC 5.08 01/30/2024 08:53 PM    HEMOGLOBIN 14.8 01/30/2024 08:53 PM    HEMATOCRIT 46.4 01/30/2024 08:53 PM    MCV 91.3 01/30/2024 08:53 PM    MCH 29.1 01/30/2024 08:53 PM    MCHC 31.9 (L) 01/30/2024 08:53 PM    MPV 9.7 01/30/2024 08:53 PM    NEUTSPOLYS 54.70 01/30/2024 08:53 PM    LYMPHOCYTES 31.40 01/30/2024 08:53 PM    MONOCYTES 9.90 01/30/2024 08:53 PM    EOSINOPHILS 1.90 01/30/2024 08:53 PM    BASOPHILS 0.60 01/30/2024 08:53 PM        IMAGING REVIEWED AND INTERPRETED:    ECHOCARDIOGRAM    none    CARDIAC CATHETERIZATION   none    [place] [facility] [place]:    Discussed with radiology Dr. Avelar by telephone: There is no flap visible in the ascending or the arch. There is very abnormal flow of contrast with some suggestion of layering in the proximal descending, then there is no flow of contrast below the endovascular stent but it is unclear if this is due to timing of contrast or actual no flow state.  The ascending, arch, and descending thoracic aorta is very aneurysmal.      Official read:   1.  Interval development of a [place] type a aortic dissection with maximal diameter of the aortic root measuring 6.4 x 7.4 cm and descending aorta measuring 7.0 x 6.5 cm. Dissection flap appears to extend to the level of the superior mesenteric artery   origin.  2.  Moderate-sized intermediate attenuation pericardial effusion likely representing hemopericardium.  3.  Severely diminished contrast opacification within the aorta below the level of the renal arteries which may related to cardiac output or flow limitation.  4.  Interval aneurysmal dilation of the abdominal aorta measuring 5.3 x 5.1 cm.  5.  Minimal if any contrast opacification in the celiac, superior mesenteric arteries and distal arterial structures.  6.  Increased bilateral common iliac artery aneurysm measuring 3.9 on the left and 3.2 cm on the right.  7.  Aortobiiliac stent graft in place.  8.  Large amount of intravenous air present in the right subclavian and brachiocephalic veins.    IMPRESSION:  74 yo gentleman with peripheral vascular disease s/p aorto-iliac endovascular stent remotely, daily cigarette smoking and poor medical follow up. He has history of falls and LOC with concussive symptoms comes in with one such episode after taking a narcotic pill. His aorta is noted to be highly abnormal, aneurysmal throughout with no ascending or arch dissection flap visualized on CTA. His exam is inconsistent with lower body ischemia so  likely there is contrast timing artifact. He does have a pericardial effusion unclear if sympathetic or bloody due to CPR or if associated with any intrapericardial aortic pathology.      PLAN:  I recommend admission to ICU for monitoring.  STAT TTE to r/o tamponade as etiology of hypotension.  Repeat CTA in the morning to look for effusion enlargement or any missed intimal defect.  Discussed with Dr. Calero of vascular surgery: he will kindly review imaging for any recommendations.  Discussed with Dr. Dupree in ICU  Discussed with Dr. Alcala in ED      Thank you for this very challenging consultation and participation in the patient’s care.  I will keep you apprised of all future developments.          Sincerely,       Tiffany Childress MD.

## 2024-01-31 NOTE — ASSESSMENT & PLAN NOTE
Combination of CT and echo imagings reveal Jim type a aortic dissection originating in the apex of the arch  Likely secondary hemopericardium precipitating hypotension  Large aortic root aneurysm 7+ centimeters  Known abdominal aortic aneurysm status post stenting  Complex aneurysm/dissection requiring higher level of care per [facility] consultation  Emergent ER to ER transfer requested to higher level of care transfer center working on this now  Not a candidate for pericardiocentesis per cardiology or [facility]  [facility] is reached out to vascular surgery for review  I have reached out to cardiology for review which is ongoing

## 2024-01-31 NOTE — DISCHARGE PLANNING
Medical Social Work    MSW received a voalte message from RT that pt is scheduled to leave via [facility] around 0730.  COBRA completed; pt signed and pt's wife was also updated at bedside.  Pt's wife states that she booked a flight to [place] for later this morning.  Pt's wife might leave bedside and requested a phone call when pt leaves this morning.  Pt's wife's number was verified: [phone].  Transfer packet complete and in pt's chart for transfer including: facesheets X3, ED Encounter Summary, and imaging disc.  Bedside RN made aware of transport time.      0640: Second imaging disc placed in transfer packet due to recent CTA.

## 2024-02-29 ENCOUNTER — OFFICE VISIT (OUTPATIENT)
Dept: CARDIOLOGY | Facility: MEDICAL CENTER | Age: 74
End: 2024-02-29
Attending: INTERNAL MEDICINE
Payer: MEDICARE

## 2024-02-29 VITALS
SYSTOLIC BLOOD PRESSURE: 90 MMHG | BODY MASS INDEX: 27.2 KG/M2 | DIASTOLIC BLOOD PRESSURE: 62 MMHG | HEIGHT: 70 IN | RESPIRATION RATE: 18 BRPM | OXYGEN SATURATION: 95 % | HEART RATE: 101 BPM | WEIGHT: 190 LBS

## 2024-02-29 DIAGNOSIS — I26.93 SINGLE SUBSEGMENTAL PULMONARY EMBOLISM WITHOUT ACUTE COR PULMONALE (HCC): ICD-10-CM

## 2024-02-29 DIAGNOSIS — I71.11 RUPTURED ANEURYSM OF ASCENDING AORTA (HCC): ICD-10-CM

## 2024-02-29 DIAGNOSIS — I71.40 ABDOMINAL AORTIC ANEURYSM (AAA) WITHOUT RUPTURE, UNSPECIFIED PART (HCC): ICD-10-CM

## 2024-02-29 DIAGNOSIS — I71.011 AORTIC ARCH DISSECTION (HCC): ICD-10-CM

## 2024-02-29 PROCEDURE — 3078F DIAST BP <80 MM HG: CPT | Performed by: INTERNAL MEDICINE

## 2024-02-29 PROCEDURE — 99406 BEHAV CHNG SMOKING 3-10 MIN: CPT | Performed by: INTERNAL MEDICINE

## 2024-02-29 PROCEDURE — 99214 OFFICE O/P EST MOD 30 MIN: CPT | Mod: 25 | Performed by: INTERNAL MEDICINE

## 2024-02-29 PROCEDURE — 99213 OFFICE O/P EST LOW 20 MIN: CPT | Mod: 25 | Performed by: INTERNAL MEDICINE

## 2024-02-29 PROCEDURE — 3074F SYST BP LT 130 MM HG: CPT | Performed by: INTERNAL MEDICINE

## 2024-02-29 RX ORDER — FAMOTIDINE 20 MG/1
20 TABLET, FILM COATED ORAL DAILY
COMMUNITY
Start: 2024-02-15 | End: 2025-02-14

## 2024-02-29 RX ORDER — POTASSIUM CHLORIDE 20 MEQ/1
20 TABLET, EXTENDED RELEASE ORAL 2 TIMES DAILY
COMMUNITY
Start: 2024-02-15 | End: 2024-02-29

## 2024-02-29 RX ORDER — ATORVASTATIN CALCIUM 40 MG/1
40 TABLET, FILM COATED ORAL NIGHTLY
Qty: 100 TABLET | Refills: 3 | Status: SHIPPED | OUTPATIENT
Start: 2024-02-29

## 2024-02-29 RX ORDER — ASCORBIC ACID 500 MG
500 TABLET ORAL 2 TIMES DAILY
COMMUNITY
Start: 2024-02-15 | End: 2024-03-16

## 2024-02-29 RX ORDER — GABAPENTIN 100 MG/1
100 CAPSULE ORAL 3 TIMES DAILY
COMMUNITY
Start: 2024-02-15 | End: 2025-02-14

## 2024-02-29 RX ORDER — ASPIRIN 81 MG/1
81 TABLET, CHEWABLE ORAL DAILY
COMMUNITY
Start: 2024-02-15 | End: 2025-02-14

## 2024-02-29 RX ORDER — FERROUS SULFATE 325(65) MG
325 TABLET ORAL DAILY
COMMUNITY
Start: 2024-02-15 | End: 2024-04-15

## 2024-02-29 RX ORDER — LANOLIN ALCOHOL/MO/W.PET/CERES
3 CREAM (GRAM) TOPICAL
COMMUNITY
Start: 2024-02-15 | End: 2025-02-14

## 2024-02-29 RX ORDER — ACETAMINOPHEN 500 MG
1000 TABLET ORAL EVERY 6 HOURS PRN
COMMUNITY
Start: 2024-02-15 | End: 2025-02-14

## 2024-02-29 RX ORDER — FUROSEMIDE 40 MG/1
40 TABLET ORAL
COMMUNITY
Start: 2024-02-15 | End: 2024-02-29

## 2024-02-29 RX ORDER — TAMSULOSIN HYDROCHLORIDE 0.4 MG/1
0.4 CAPSULE ORAL DAILY
COMMUNITY
Start: 2024-02-15 | End: 2025-02-14

## 2024-02-29 RX ORDER — SENNOSIDES A AND B 8.6 MG/1
2 TABLET, FILM COATED ORAL DAILY
COMMUNITY
Start: 2024-02-15 | End: 2024-03-01

## 2024-02-29 RX ORDER — TRAMADOL HYDROCHLORIDE 50 MG/1
50 TABLET ORAL EVERY 4 HOURS PRN
COMMUNITY

## 2024-02-29 ASSESSMENT — FIBROSIS 4 INDEX: FIB4 SCORE: 3.62

## 2024-02-29 NOTE — PROGRESS NOTES
"CARDIOLOGY NEW PATIENT CONSULTATION    PCP: Reji Johnson M.D.    1. Ruptured aneurysm of ascending aorta (HCC)    2. Aortic arch dissection (HCC)    3. Abdominal aortic aneurysm (AAA) without rupture, unspecified part (HCC)    4. Single subsegmental pulmonary embolism without acute cor pulmonale (HCC)        Onofre Mccormack is recovering as expected following recent complex aortic elephant trunk repair with aortic valve resuspension.  He will update an echocardiogram, start atorvastatin 40 mg nightly and a laboratory profile in 3 months.  Hopefully the suspected left pleural effusion will resolve and he will be able to come off oxygen over this time.  I also counseled him for 4 minutes on smoking cessation.  He states he is done with cigarettes.    Follow up: 3 months      History: Onofre Mccormack is a 73 y.o. male with history of AAA repair who presents to \[facility]\"" care after recent type a dissection complicated by hemopericardium.  He underwent ascending and descending thoracic aortic repair at [facility] in early February.  He has done well and is planned to have staged descending repair in about another month.  He is wearing 2 L oxygen and has a Moreno catheter with plans of trial of void later this week.  He has been a lifelong smoker.  He is compelled to quit now.    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 We discussed cold turkey planned.   () Today's E/M visit is associated with medical care services that serve as the continuing focal point for all needed health care services and/or with medical care services that  are part of ongoing " "care related to a patient's single, serious condition, or a complex condition: This includes  furnishing services to patients on an ongoing basis that result in care that is personalized  to the patient. The services result in a comprehensive, longitudinal, and continuous  relationship with the patient and involve delivery of team-based care that is accessible, coordinated with other practitioners and providers, and integrated with the broader health  care landscape.     The ASCVD Risk score (Soumya FERNANDEZ, et al., 2019) failed to calculate.    Studies  No results found for: \"CHOLSTRLTOT\", \"LDL\", \"HDL\", \"TRIGLYCERIDE\"    Lab Results   Component Value Date/Time    SODIUM 137 01/31/2024 05:03 AM    POTASSIUM 5.4 01/31/2024 05:03 AM    CHLORIDE 104 01/31/2024 05:03 AM    CO2 20 01/31/2024 05:03 AM    GLUCOSE 115 02/05/2024 06:16 PM    GLUCOSE 220 (H) 01/31/2024 05:03 AM    BUN 23 (H) 01/31/2024 05:03 AM    CREATININE 1.51 (H) 01/31/2024 05:03 AM     Lab Results   Component Value Date/Time    ALKPHOSPHAT 105 (H) 01/31/2024 05:03 AM    ASTSGOT 96 (H) 01/31/2024 05:03 AM    ALTSGPT 96 (H) 01/31/2024 05:03 AM    TBILIRUBIN 1.3 01/31/2024 05:03 AM      No results found for: \"HGB\"     Past Medical History:   Diagnosis Date    AAA (abdominal aortic aneurysm) (HCC)     Arthritis     back    Dental disorder     upper dentures    [place] (hard of hearing)     Pneumonia 04/01/2015    Snoring      Past Surgical History:   Procedure Laterality Date    FUSION, SPINE, LUMBAR, PLIF Right 10/5/2015    Procedure: LUMBAR FUSION POSTERIOR L4-5 XLIF, L5-S1 MINI OPEN TLIF;  Surgeon: Forrest Smalls M.D.;  Location: [facility];  Service:     AAA WITH STENT GRAFT  11/19/2014    Performed by Seth Calero M.D. at [facility]    OTHER ABDOMINAL SURGERY  1980's    hernia    OTHER      epidurals for back pain /triggerpoint injs     Allergies   Allergen Reactions    Penicillins Hives and Itching     Unknown as a child "     Outpatient Encounter Medications as of 2024   Medication Sig Dispense Refill    ascorbic acid (VITAMIN C) 500 MG tablet Take 500 mg by mouth 2 times a day.      aspirin (ASA) 81 MG Chew Tab chewable tablet Chew 81 mg every day.      apixaban (ELIQUIS) 5mg Tab Take 5 mg by mouth 2 times a day.      famotidine (PEPCID) 20 MG Tab Take 20 mg by mouth every day.      ferrous sulfate 325 (65 Fe) MG tablet Take 325 mg by mouth every day.      furosemide (LASIX) 40 MG Tab Take 40 mg by mouth 2 times a day.      gabapentin (NEURONTIN) 100 MG Cap Take 100 mg by mouth 3 times a day.      melatonin 3 MG Tab Take 3 mg by mouth.      potassium chloride SA (KDUR) 20 MEQ Tab CR Take 20 mEq by mouth 2 times a day.      sennosides (SENOKOT) 8.6 MG Tab Take 2 Tablets by mouth every day.      tamsulosin (FLOMAX) 0.4 MG capsule Take 0.4 mg by mouth every day.      traMADol (ULTRAM) 50 MG Tab Take 50 mg by mouth every four hours as needed.      acetaminophen (TYLENOL) 500 MG Tab Take 1,000 mg by mouth every 6 hours as needed.      atorvastatin (LIPITOR) 40 MG Tab Take 1 Tablet by mouth every evening. 100 Tablet 3     No facility-administered encounter medications on file as of 2024.     Social History     Socioeconomic History    Marital status:      Spouse name: Not on file    Number of children: Not on file    Years of education: Not on file    Highest education level: Not on file   Occupational History    Not on file   Tobacco Use    Smoking status: Former     Current packs/day: 0.00     Average packs/day: 0.3 packs/day for 40.0 years (10.0 ttl pk-yrs)     Types: Cigarettes     Quit date: 2024     Years since quittin.0    Smokeless tobacco: Never   Vaping Use    Vaping Use: Never used   Substance and Sexual Activity    Alcohol use: No    Drug use: No    Sexual activity: Not on file   Other Topics Concern    Not on file   Social History Narrative    Not on file     Social Determinants of Health      Financial Resource Strain: Not on file   Food Insecurity: Not on file   Transportation Needs: Not on file   Physical Activity: Not on file   Stress: Not on file   Social Connections: Not on file   Intimate Partner Violence: Not on file   Housing Stability: Not on file     History reviewed. No pertinent family history.    No chief complaint on file.      ROS:   10 point review systems is otherwise negative except as per the HPI

## 2024-03-15 ENCOUNTER — HOSPITAL ENCOUNTER (OUTPATIENT)
Dept: RADIOLOGY | Facility: MEDICAL CENTER | Age: 74
End: 2024-03-15
Attending: PHYSICIAN ASSISTANT
Payer: MEDICARE

## 2024-03-15 DIAGNOSIS — I71.00 DISSECTING AORTIC ANEURYSM (HCC): ICD-10-CM

## 2024-03-15 PROCEDURE — 71275 CT ANGIOGRAPHY CHEST: CPT

## 2024-03-15 PROCEDURE — 700117 HCHG RX CONTRAST REV CODE 255

## 2024-03-15 RX ADMIN — IOHEXOL 120 ML: 350 INJECTION, SOLUTION INTRAVENOUS at 09:31

## 2024-03-20 ENCOUNTER — TELEPHONE (OUTPATIENT)
Dept: CARDIOLOGY | Facility: MEDICAL CENTER | Age: 74
End: 2024-03-20
Payer: MEDICARE

## 2024-03-20 DIAGNOSIS — I71.11 RUPTURED ANEURYSM OF ASCENDING AORTA (HCC): ICD-10-CM

## 2024-03-20 DIAGNOSIS — I71.011 AORTIC ARCH DISSECTION (HCC): ICD-10-CM

## 2024-03-20 DIAGNOSIS — I71.40 ABDOMINAL AORTIC ANEURYSM (AAA) WITHOUT RUPTURE, UNSPECIFIED PART (HCC): ICD-10-CM

## 2024-03-20 NOTE — TELEPHONE ENCOUNTER
BE    Caller:  Jodie (spouse)    Topic/issue: Would like a referral for cardiac rehab for Onofre.     Callback Number: [phone]    Thank you,   Talya WYATT

## 2024-03-22 NOTE — TELEPHONE ENCOUNTER
Jerson Oconnor M.D.  You19 hours ago (2:20 PM)     Ok to refer     Phone Number Called: [phone]    Call outcome:  spoke to patient wife    Message: Called to inform patient wife that referral to cardiac rehab has been placed at this time. All questions answered at this time. Advised to call back with any further questions or concerns.

## 2024-04-11 ENCOUNTER — TELEPHONE (OUTPATIENT)
Dept: CARDIOLOGY | Facility: MEDICAL CENTER | Age: 74
End: 2024-04-11

## 2024-04-11 ENCOUNTER — HOSPITAL ENCOUNTER (OUTPATIENT)
Dept: CARDIOLOGY | Facility: MEDICAL CENTER | Age: 74
End: 2024-04-11
Attending: INTERNAL MEDICINE
Payer: MEDICARE

## 2024-04-11 DIAGNOSIS — I71.11 RUPTURED ANEURYSM OF ASCENDING AORTA (HCC): ICD-10-CM

## 2024-04-11 DIAGNOSIS — I71.40 ABDOMINAL AORTIC ANEURYSM (AAA) WITHOUT RUPTURE, UNSPECIFIED PART (HCC): ICD-10-CM

## 2024-04-11 LAB
LV EJECT FRACT  99904: 45
LV EJECT FRACT MOD 2C 99903: 50.33
LV EJECT FRACT MOD 4C 99902: 40.36
LV EJECT FRACT MOD BP 99901: 45.68

## 2024-04-11 PROCEDURE — 93306 TTE W/DOPPLER COMPLETE: CPT | Mod: 26 | Performed by: INTERNAL MEDICINE

## 2024-04-11 PROCEDURE — 93306 TTE W/DOPPLER COMPLETE: CPT

## 2024-04-11 NOTE — TELEPHONE ENCOUNTER
BE  Caller: Jodie - Wife    Topic/issue: Patient was referred to [facility]'s cardiac rehab. Patient would like to stay with [facility] and be referred to [facility]'s Cardiac Rehab. Patient is asking that a referral be placed for [facility]. Please call patient back to confirm.    Callback Number: [phone]    Thank you,  Zeenat SCHMIDT

## 2024-04-12 NOTE — TELEPHONE ENCOUNTER
Phone Number Called: [phone]    Call outcome:  spoke to patient spouse    Message: Called to inform patient spouse that new referral for [facility] has been placed at this time for [facility]. All questions answered at this time. Advised to call back with any further questions or concerns.

## 2024-05-20 ENCOUNTER — HOSPITAL ENCOUNTER (OUTPATIENT)
Dept: LAB | Facility: MEDICAL CENTER | Age: 74
End: 2024-05-20
Attending: INTERNAL MEDICINE
Payer: MEDICARE

## 2024-05-20 DIAGNOSIS — I71.11 RUPTURED ANEURYSM OF ASCENDING AORTA (HCC): ICD-10-CM

## 2024-05-20 LAB
ALBUMIN SERPL BCP-MCNC: 3.3 G/DL (ref 3.2–4.9)
ALBUMIN/GLOB SERPL: 0.8 G/DL
ALP SERPL-CCNC: 157 U/L (ref 30–99)
ALT SERPL-CCNC: 17 U/L (ref 2–50)
ANION GAP SERPL CALC-SCNC: 14 MMOL/L (ref 7–16)
AST SERPL-CCNC: 18 U/L (ref 12–45)
BILIRUB SERPL-MCNC: 0.4 MG/DL (ref 0.1–1.5)
BUN SERPL-MCNC: 17 MG/DL (ref 8–22)
CALCIUM ALBUM COR SERPL-MCNC: 9.7 MG/DL (ref 8.5–10.5)
CALCIUM SERPL-MCNC: 9.1 MG/DL (ref 8.5–10.5)
CHLORIDE SERPL-SCNC: 101 MMOL/L (ref 96–112)
CHOLEST SERPL-MCNC: 117 MG/DL (ref 100–199)
CO2 SERPL-SCNC: 24 MMOL/L (ref 20–33)
CREAT SERPL-MCNC: 0.73 MG/DL (ref 0.5–1.4)
ERYTHROCYTE [DISTWIDTH] IN BLOOD BY AUTOMATED COUNT: 52.5 FL (ref 35.9–50)
FASTING STATUS PATIENT QL REPORTED: NORMAL
GFR SERPLBLD CREATININE-BSD FMLA CKD-EPI: 96 ML/MIN/1.73 M 2
GLOBULIN SER CALC-MCNC: 4 G/DL (ref 1.9–3.5)
GLUCOSE SERPL-MCNC: 112 MG/DL (ref 65–99)
HCT VFR BLD AUTO: 34.1 % (ref 42–52)
HDLC SERPL-MCNC: 31 MG/DL
HGB BLD-MCNC: 10 G/DL (ref 14–18)
LDLC SERPL CALC-MCNC: 72 MG/DL
MCH RBC QN AUTO: 24.2 PG (ref 27–33)
MCHC RBC AUTO-ENTMCNC: 29.3 G/DL (ref 32.3–36.5)
MCV RBC AUTO: 82.6 FL (ref 81.4–97.8)
PLATELET # BLD AUTO: 550 K/UL (ref 164–446)
PMV BLD AUTO: 8.2 FL (ref 9–12.9)
POTASSIUM SERPL-SCNC: 3.9 MMOL/L (ref 3.6–5.5)
PROT SERPL-MCNC: 7.3 G/DL (ref 6–8.2)
RBC # BLD AUTO: 4.13 M/UL (ref 4.7–6.1)
SODIUM SERPL-SCNC: 139 MMOL/L (ref 135–145)
TRIGL SERPL-MCNC: 69 MG/DL (ref 0–149)
WBC # BLD AUTO: 8.4 K/UL (ref 4.8–10.8)

## 2024-06-03 ENCOUNTER — APPOINTMENT (OUTPATIENT)
Dept: RADIOLOGY | Facility: MEDICAL CENTER | Age: 74
End: 2024-06-03
Payer: MEDICARE

## 2024-06-10 ENCOUNTER — HOSPITAL ENCOUNTER (OUTPATIENT)
Dept: RADIOLOGY | Facility: MEDICAL CENTER | Age: 74
End: 2024-06-10
Payer: MEDICARE

## 2024-06-10 DIAGNOSIS — I71.00 DISSECTION OF AORTA, UNSPECIFIED PORTION OF AORTA (HCC): ICD-10-CM

## 2024-06-10 PROCEDURE — 700117 HCHG RX CONTRAST REV CODE 255

## 2024-06-10 PROCEDURE — 71275 CT ANGIOGRAPHY CHEST: CPT

## 2024-06-10 RX ADMIN — IOHEXOL 100 ML: 350 INJECTION, SOLUTION INTRAVENOUS at 11:30

## 2024-06-11 ENCOUNTER — APPOINTMENT (OUTPATIENT)
Dept: CARDIOLOGY | Facility: MEDICAL CENTER | Age: 74
End: 2024-06-11
Attending: INTERNAL MEDICINE
Payer: MEDICARE

## 2024-06-11 VITALS
RESPIRATION RATE: 18 BRPM | SYSTOLIC BLOOD PRESSURE: 100 MMHG | BODY MASS INDEX: 27.2 KG/M2 | OXYGEN SATURATION: 95 % | HEART RATE: 108 BPM | DIASTOLIC BLOOD PRESSURE: 60 MMHG | HEIGHT: 70 IN | WEIGHT: 190 LBS

## 2024-06-11 DIAGNOSIS — I71.40 ABDOMINAL AORTIC ANEURYSM (AAA) WITHOUT RUPTURE, UNSPECIFIED PART (HCC): ICD-10-CM

## 2024-06-11 DIAGNOSIS — I51.89 MILD LEFT VENTRICULAR SYSTOLIC DYSFUNCTION (LVSD): ICD-10-CM

## 2024-06-11 DIAGNOSIS — I26.93 SINGLE SUBSEGMENTAL PULMONARY EMBOLISM WITHOUT ACUTE COR PULMONALE (HCC): ICD-10-CM

## 2024-06-11 DIAGNOSIS — I71.11 RUPTURED ANEURYSM OF ASCENDING AORTA (HCC): ICD-10-CM

## 2024-06-11 PROBLEM — R57.9 SHOCK (HCC): Status: RESOLVED | Noted: 2024-01-31 | Resolved: 2024-06-11

## 2024-06-11 PROBLEM — I71.011 AORTIC ARCH DISSECTION (HCC): Status: RESOLVED | Noted: 2024-01-31 | Resolved: 2024-06-11

## 2024-06-11 LAB — EKG IMPRESSION: NORMAL

## 2024-06-11 PROCEDURE — 93010 ELECTROCARDIOGRAM REPORT: CPT | Performed by: INTERNAL MEDICINE

## 2024-06-11 PROCEDURE — G2211 COMPLEX E/M VISIT ADD ON: HCPCS | Performed by: INTERNAL MEDICINE

## 2024-06-11 PROCEDURE — 99215 OFFICE O/P EST HI 40 MIN: CPT | Performed by: INTERNAL MEDICINE

## 2024-06-11 PROCEDURE — 93005 ELECTROCARDIOGRAM TRACING: CPT | Performed by: INTERNAL MEDICINE

## 2024-06-11 PROCEDURE — 99212 OFFICE O/P EST SF 10 MIN: CPT | Performed by: INTERNAL MEDICINE

## 2024-06-11 PROCEDURE — 3078F DIAST BP <80 MM HG: CPT | Performed by: INTERNAL MEDICINE

## 2024-06-11 PROCEDURE — 3074F SYST BP LT 130 MM HG: CPT | Performed by: INTERNAL MEDICINE

## 2024-06-11 RX ORDER — CLOPIDOGREL BISULFATE 75 MG/1
75 TABLET ORAL DAILY
COMMUNITY
Start: 2024-05-12 | End: 2025-05-12

## 2024-06-11 RX ORDER — FUROSEMIDE 40 MG/1
40 TABLET ORAL DAILY
COMMUNITY
Start: 2024-03-09 | End: 2024-06-11

## 2024-06-11 RX ORDER — FERROUS SULFATE 325(65) MG
325 TABLET ORAL DAILY
COMMUNITY

## 2024-06-11 RX ORDER — MIDODRINE HYDROCHLORIDE 5 MG/1
5 TABLET ORAL 3 TIMES DAILY
COMMUNITY
Start: 2024-05-12 | End: 2024-06-11

## 2024-06-11 RX ORDER — POTASSIUM CHLORIDE 1500 MG/1
1 TABLET, EXTENDED RELEASE ORAL 2 TIMES DAILY
COMMUNITY
Start: 2024-03-09 | End: 2024-06-11

## 2024-06-11 ASSESSMENT — FIBROSIS 4 INDEX: FIB4 SCORE: 0.57

## 2024-06-11 NOTE — PROGRESS NOTES
"CARDIOLOGY OUTPATIENT FOLLOWUP    PCP: Reji Johnson M.D.    1. Ruptured aneurysm of ascending aorta (HCC)    2. Abdominal aortic aneurysm (AAA) without rupture, unspecified part (HCC)    3. Single subsegmental pulmonary embolism without acute cor pulmonale (HCC)    4. Mild left ventricular systolic dysfunction (LVSD)      Onofre Mccormack has ongoing orthostasis but otherwise doing well following complex multiphase aortic repair.  I will await determination from [facility] regarding the management of endoleak but did recommend the patient reconnect with vascular surgery locally.  Orthostasis may be aggravated by dehydration and furosemide/potassium was discontinued.  I will contact him in a week and can consider weaning from midodrine as well.  Pending this progress, we could consider implementation of GDMT for heart failure.  I did offer genetic testing and provided him with an [employer] kit.    He will remain on Eliquis through 6 months and then may transition to antiplatelet monotherapy.    The systolic murmur is unaccounted for on the recent echocardiogram.  Expectant management will be planned for the time being    Follow up: 3 months    History: Onofre Mccormack is a 73 y.o. male with history of infrarenal EVAR in 2014, type a dissection with hemopericardium January 2024 repaired with emergent total arch replacement with elephant trunk followed by TEVAR extension (2/2024) and now subsequent fenestrated EVAR 5/2024 through the abdominal aorta with residual type II endoleak.  LVEF is 45%    He is quit smoking.  Back to playing pool.  May need additional procedures for the endoleak-awaiting [facility]'s opinion.  Desires reconnecting with local services.      Physical Exam:  /60 (BP Location: Left arm, Patient Position: Sitting, BP Cuff Size: Adult)   Pulse (!) 108   Resp 18   Ht 1.778 m (5' 10\")   Wt 86.2 kg (190 lb)   SpO2 95%   BMI 27.26 kg/m²   GEN: NAD  CARDIAC: Regular. Normal S1, S2, " Systolic murmur.   VASCULATURE: Normal carotid upstroke-3+ popliteal pulse on the left, 1+ on the right  RESP: Clear to auscultation bilaterally  ABD: Soft, non-tender, non-distended  EXT: No edema  NEURO: No focal deficit       () Today's E/M visit is associated with medical care services that serve as the continuing focal point for all needed health care services and/or with medical care services that  are part of ongoing care related to a patient's single, serious condition, or a complex condition: This includes  furnishing services to patients on an ongoing basis that result in care that is personalized  to the patient. The services result in a comprehensive, longitudinal, and continuous  relationship with the patient and involve delivery of team-based care that is accessible, coordinated with other practitioners and providers, and integrated with the broader health  care landscape.     The ASCVD Risk score (Soumya FERNANDEZ, et al., 2019) failed to calculate.    Studies  Lab Results   Component Value Date/Time    CHOLSTRLTOT 117 05/20/2024 08:24 AM    LDL 72 05/20/2024 08:24 AM    HDL 31 (A) 05/20/2024 08:24 AM    TRIGLYCERIDE 69 05/20/2024 08:24 AM       Lab Results   Component Value Date/Time    SODIUM 139 05/20/2024 08:24 AM    POTASSIUM 3.9 05/20/2024 08:24 AM    CHLORIDE 101 05/20/2024 08:24 AM    CO2 24 05/20/2024 08:24 AM    GLUCOSE 112 (H) 05/20/2024 08:24 AM    BUN 17 05/20/2024 08:24 AM    CREATININE 0.73 05/20/2024 08:24 AM      Lab Results   Component Value Date/Time    PROTHROMBTM 15.6 (H) 01/30/2024 08:53 PM    INR 1.23 (H) 01/30/2024 08:53 PM      Lab Results   Component Value Date/Time    WBC 8.4 05/20/2024 08:24 AM    RBC 4.13 (L) 05/20/2024 08:24 AM    HEMOGLOBIN 10.0 (L) 05/20/2024 08:24 AM    HEMATOCRIT 34.1 (L) 05/20/2024 08:24 AM    MCV 82.6 05/20/2024 08:24 AM    MCH 24.2 (L) 05/20/2024 08:24 AM    MCHC 29.3 (L) 05/20/2024 08:24 AM    MPV 8.2 (L) 05/20/2024 08:24 AM    NEUTSPOLYS 54.70  2024 08:53 PM    LYMPHOCYTES 31.40 2024 08:53 PM    MONOCYTES 9.90 2024 08:53 PM    EOSINOPHILS 1.90 2024 08:53 PM    BASOPHILS 0.60 2024 08:53 PM        Past Medical History:   Diagnosis Date    AAA (abdominal aortic aneurysm) (HCC)     Arthritis     back    Dental disorder     upper dentures    [place] (hard of hearing)     Pneumonia 2015    Snoring      Allergies   Allergen Reactions    Penicillins Hives and Itching     Unknown as a child     Outpatient Encounter Medications as of 2024   Medication Sig Dispense Refill    apixaban (ELIQUIS) 5mg Tab Take 5 mg by mouth 2 times a day.      tamsulosin (FLOMAX) 0.4 MG capsule Take 0.4 mg by mouth every day.      acetaminophen (TYLENOL) 500 MG Tab Take 1,000 mg by mouth every 6 hours as needed.      atorvastatin (LIPITOR) 40 MG Tab Take 1 Tablet by mouth every evening. 100 Tablet 3    [DISCONTINUED] aspirin (ASA) 81 MG Chew Tab chewable tablet Chew 81 mg every day.      [DISCONTINUED] famotidine (PEPCID) 20 MG Tab Take 20 mg by mouth every day.      [DISCONTINUED] gabapentin (NEURONTIN) 100 MG Cap Take 100 mg by mouth 3 times a day.      [DISCONTINUED] melatonin 3 MG Tab Take 3 mg by mouth.      [DISCONTINUED] traMADol (ULTRAM) 50 MG Tab Take 50 mg by mouth every four hours as needed.       No facility-administered encounter medications on file as of 2024.     Social History     Socioeconomic History    Marital status:      Spouse name: Not on file    Number of children: Not on file    Years of education: Not on file    Highest education level: Not on file   Occupational History    Not on file   Tobacco Use    Smoking status: Former     Current packs/day: 0.00     Average packs/day: 0.3 packs/day for 40.0 years (10.0 ttl pk-yrs)     Types: Cigarettes     Quit date: 2024     Years since quittin.3    Smokeless tobacco: Never   Vaping Use    Vaping status: Never Used   Substance and Sexual Activity    Alcohol use:  No    Drug use: No    Sexual activity: Not on file   Other Topics Concern    Not on file   Social History Narrative    Not on file     Social Determinants of Health     Financial Resource Strain: Not on file   Food Insecurity: Not At Risk (5/8/2024)    Received from [facility]    Food Insecurity     Because difficulties at home can cause health problems, we are asking all of our patients if they are experiencing difficulties in any of the following areas:: Patient does not need help with any of these   Transportation Needs: Not At Risk (5/8/2024)    Received from [facility]    Food Insecurity     Because difficulties at home can cause health problems, we are asking all of our patients if they are experiencing difficulties in any of the following areas:: Patient does not need help with any of these   Physical Activity: Not on file   Stress: Not on file   Social Connections: Not on file   Intimate Partner Violence: Not on file   Housing Stability: Not At Risk (5/8/2024)    Received from [facility]    Food Insecurity     Because difficulties at home can cause health problems, we are asking all of our patients if they are experiencing difficulties in any of the following areas:: Patient does not need help with any of these     45-minutes spent during today's encounter    ROS:   10 point review systems is otherwise negative except as per the HPI    No chief complaint on file.

## 2024-06-13 ENCOUNTER — TELEPHONE (OUTPATIENT)
Dept: CARDIOLOGY | Facility: MEDICAL CENTER | Age: 74
End: 2024-06-13
Payer: MEDICARE

## 2024-06-13 NOTE — TELEPHONE ENCOUNTER
[employer] order form completed, printed and placed on BE desk for signature. Face sheet, insurance card, and ID also printed and paper clipped to order on BE desk.    ------------------------------------------------------------------------------------------------------      ----- Message from Radha Cooper R.N. sent at 6/13/2024  1:26 PM PDT -----  Regarding: FW: [employer]    ----- Message -----  From: Jerson Oconnor M.D.  Sent: 6/12/2024  11:29 AM PDT  To: Kamala Lewis R.N.  Subject: RE: [employer]                                      The aortopathy panel.     Thanks  BE  ----- Message -----  From: Kamala Lewis R.N.  Sent: 6/11/2024   4:03 PM PDT  To: Jerson Oconnor M.D.  Subject: [employer]                                          We do not have any invitae kits in the office, so it will have to be mailed to him. What are you wanting genetic testing for? Thanks!  ----- Message -----  From: Dona Walls, Med Ass't  Sent: 6/11/2024   3:56 PM PDT  To: Kamala Lewis R.N.    Hi, BE wanted a saliva genetic testing for this pt not sure what is needed on my end.

## 2024-09-05 ENCOUNTER — HOSPITAL ENCOUNTER (OUTPATIENT)
Dept: LAB | Facility: MEDICAL CENTER | Age: 74
End: 2024-09-05
Attending: STUDENT IN AN ORGANIZED HEALTH CARE EDUCATION/TRAINING PROGRAM
Payer: MEDICARE

## 2024-09-05 LAB
ANION GAP SERPL CALC-SCNC: 11 MMOL/L (ref 7–16)
BUN SERPL-MCNC: 19 MG/DL (ref 8–22)
CALCIUM SERPL-MCNC: 8.8 MG/DL (ref 8.5–10.5)
CHLORIDE SERPL-SCNC: 104 MMOL/L (ref 96–112)
CO2 SERPL-SCNC: 25 MMOL/L (ref 20–33)
CREAT SERPL-MCNC: 0.71 MG/DL (ref 0.5–1.4)
GFR SERPLBLD CREATININE-BSD FMLA CKD-EPI: 97 ML/MIN/1.73 M 2
GLUCOSE SERPL-MCNC: 94 MG/DL (ref 65–99)
POTASSIUM SERPL-SCNC: 5 MMOL/L (ref 3.6–5.5)
SODIUM SERPL-SCNC: 140 MMOL/L (ref 135–145)

## 2024-09-05 PROCEDURE — 36415 COLL VENOUS BLD VENIPUNCTURE: CPT

## 2024-09-05 PROCEDURE — 80048 BASIC METABOLIC PNL TOTAL CA: CPT

## 2024-09-13 ENCOUNTER — HOSPITAL ENCOUNTER (OUTPATIENT)
Dept: RADIOLOGY | Facility: MEDICAL CENTER | Age: 74
End: 2024-09-13
Payer: MEDICARE

## 2024-09-13 DIAGNOSIS — I71.00 DISSECTING AORTIC ANEURYSM (HCC): ICD-10-CM

## 2024-09-13 PROCEDURE — 71275 CT ANGIOGRAPHY CHEST: CPT

## 2024-09-13 PROCEDURE — 700117 HCHG RX CONTRAST REV CODE 255

## 2024-09-13 RX ADMIN — IOHEXOL 100 ML: 350 INJECTION, SOLUTION INTRAVENOUS at 11:45

## 2024-09-16 ENCOUNTER — HOSPITAL ENCOUNTER (OUTPATIENT)
Dept: CARDIOLOGY | Facility: MEDICAL CENTER | Age: 74
End: 2024-09-16
Attending: INTERNAL MEDICINE
Payer: MEDICARE

## 2024-09-16 DIAGNOSIS — I71.11 RUPTURED ANEURYSM OF ASCENDING AORTA (HCC): ICD-10-CM

## 2024-09-16 LAB
LV EJECT FRACT  99904: 45
LV EJECT FRACT MOD 2C 99903: 51.4
LV EJECT FRACT MOD 4C 99902: 47.52
LV EJECT FRACT MOD BP 99901: 49.17

## 2024-09-16 PROCEDURE — 93306 TTE W/DOPPLER COMPLETE: CPT | Mod: 26 | Performed by: INTERNAL MEDICINE

## 2024-09-16 PROCEDURE — 93306 TTE W/DOPPLER COMPLETE: CPT

## 2024-09-23 PROBLEM — E87.20 METABOLIC ACIDOSIS: Status: RESOLVED | Noted: 2024-01-31 | Resolved: 2024-09-23

## 2024-09-23 PROBLEM — J96.01 ACUTE RESPIRATORY FAILURE WITH HYPOXIA (HCC): Status: RESOLVED | Noted: 2024-01-31 | Resolved: 2024-09-23

## 2024-09-24 ENCOUNTER — OFFICE VISIT (OUTPATIENT)
Dept: CARDIOLOGY | Facility: MEDICAL CENTER | Age: 74
End: 2024-09-24
Attending: NURSE PRACTITIONER
Payer: MEDICARE

## 2024-09-24 ENCOUNTER — TELEPHONE (OUTPATIENT)
Dept: CARDIOLOGY | Facility: MEDICAL CENTER | Age: 74
End: 2024-09-24

## 2024-09-24 VITALS
HEART RATE: 99 BPM | WEIGHT: 195 LBS | SYSTOLIC BLOOD PRESSURE: 106 MMHG | HEIGHT: 70 IN | RESPIRATION RATE: 16 BRPM | BODY MASS INDEX: 27.92 KG/M2 | OXYGEN SATURATION: 97 % | DIASTOLIC BLOOD PRESSURE: 60 MMHG

## 2024-09-24 DIAGNOSIS — J96.11 CHRONIC RESPIRATORY FAILURE WITH HYPOXIA (HCC): ICD-10-CM

## 2024-09-24 DIAGNOSIS — I50.22 CHRONIC SYSTOLIC HEART FAILURE (HCC): ICD-10-CM

## 2024-09-24 DIAGNOSIS — I51.89 MILD LEFT VENTRICULAR SYSTOLIC DYSFUNCTION (LVSD): ICD-10-CM

## 2024-09-24 DIAGNOSIS — I71.40 ABDOMINAL AORTIC ANEURYSM (AAA) WITHOUT RUPTURE, UNSPECIFIED PART (HCC): ICD-10-CM

## 2024-09-24 DIAGNOSIS — I26.93 SINGLE SUBSEGMENTAL PULMONARY EMBOLISM WITHOUT ACUTE COR PULMONALE (HCC): ICD-10-CM

## 2024-09-24 PROBLEM — R55 SYNCOPE: Status: RESOLVED | Noted: 2024-01-31 | Resolved: 2024-09-24

## 2024-09-24 PROCEDURE — 3074F SYST BP LT 130 MM HG: CPT | Performed by: NURSE PRACTITIONER

## 2024-09-24 PROCEDURE — 99211 OFF/OP EST MAY X REQ PHY/QHP: CPT | Performed by: NURSE PRACTITIONER

## 2024-09-24 PROCEDURE — 3078F DIAST BP <80 MM HG: CPT | Performed by: NURSE PRACTITIONER

## 2024-09-24 PROCEDURE — 99214 OFFICE O/P EST MOD 30 MIN: CPT | Performed by: NURSE PRACTITIONER

## 2024-09-24 RX ORDER — ASPIRIN 81 MG/1
81 TABLET ORAL DAILY
COMMUNITY

## 2024-09-24 RX ORDER — METOPROLOL SUCCINATE 25 MG/1
25 TABLET, EXTENDED RELEASE ORAL EVERY EVENING
Qty: 90 TABLET | Refills: 3 | Status: SHIPPED | OUTPATIENT
Start: 2024-09-24

## 2024-09-24 ASSESSMENT — ENCOUNTER SYMPTOMS
ABDOMINAL PAIN: 0
PALPITATIONS: 1
MYALGIAS: 0
CLAUDICATION: 0
COUGH: 0
PND: 0
SHORTNESS OF BREATH: 1
DIZZINESS: 0
FEVER: 0
ORTHOPNEA: 0

## 2024-09-24 ASSESSMENT — FIBROSIS 4 INDEX: FIB4 SCORE: 0.57

## 2024-09-24 NOTE — PROGRESS NOTES
Chief Complaint   Patient presents with    Syncope     Subjective     Onofre Mauricio Mccormack is a 73 y.o. male who presents today for 6 month follow up.    He is a patient of Dr. Oconnor in our office. Hx of AAA repair, HLD, former smoker with emphysema, LV dysfunction with rEF at 45%, chronic respiratory failure with supplemental oxygen at 2L (mostly nocturnal), and prior PE.    He presents today alone. He is overall doing well.    Some palpitations at times, short lived.    He has no chest pain, shortness of breath, edema, or dizziness/lightheadedness.    Past Medical History:   Diagnosis Date    AAA (abdominal aortic aneurysm) (HCC)     Arthritis     back    Dental disorder     upper dentures    [place] (hard of hearing)     Pneumonia 2015    Snoring      Past Surgical History:   Procedure Laterality Date    FUSION, SPINE, LUMBAR, PLIF Right 10/5/2015    Procedure: LUMBAR FUSION POSTERIOR L4-5 XLIF, L5-S1 MINI OPEN TLIF;  Surgeon: Forrest Smalls M.D.;  Location: [facility]ERY [facility];  Service:     AAA WITH STENT GRAFT  2014    Performed by Seth Calero M.D. at SUR[facility]ERY [facility]    OTHER ABDOMINAL SURGERY      hernia    OTHER      epidurals for back pain /triggerpoint injs     History reviewed. No pertinent family history.  Social History     Socioeconomic History    Marital status:      Spouse name: Not on file    Number of children: Not on file    Years of education: Not on file    Highest education level: Not on file   Occupational History    Not on file   Tobacco Use    Smoking status: Former     Current packs/day: 0.00     Average packs/day: 0.3 packs/day for 40.0 years (10.0 ttl pk-yrs)     Types: Cigarettes     Quit date: 2024     Years since quittin.6    Smokeless tobacco: Never   Vaping Use    Vaping status: Never Used   Substance and Sexual Activity    Alcohol use: No    Drug use: No    Sexual activity: Not on file   Other Topics Concern    Not on file   Social  History Narrative    Not on file     Social Determinants of Health     Financial Resource Strain: Not on file   Food Insecurity: Not At Risk (5/8/2024)    Received from [facility], [facility]    Food Insecurity     Because difficulties at home can cause health problems, we are asking all of our patients if they are experiencing difficulties in any of the following areas:: Patient does not need help with any of these   Transportation Needs: Not At Risk (5/8/2024)    Received from [facility], [facility]    Food Insecurity     Because difficulties at home can cause health problems, we are asking all of our patients if they are experiencing difficulties in any of the following areas:: Patient does not need help with any of these   Physical Activity: Not on file   Stress: Not on file   Social Connections: Not on file   Intimate Partner Violence: Not on file   Housing Stability: Not At Risk (5/8/2024)    Received from [facility], [facility]    Food Insecurity     Because difficulties at home can cause health problems, we are asking all of our patients if they are experiencing difficulties in any of the following areas:: Patient does not need help with any of these     Allergies   Allergen Reactions    Penicillins Hives and Itching     Unknown as a child     Outpatient Encounter Medications as of 9/24/2024   Medication Sig Dispense Refill    aspirin 81 MG EC tablet Take 81 mg by mouth every day.      ascorbic acid (VITAMIN C) 250 MG tablet Take 250 mg by mouth every day.      [DISCONTINUED] clopidogrel (PLAVIX) 75 MG Tab Take 75 mg by mouth every day.      [DISCONTINUED] ferrous sulfate 325 (65 Fe) MG tablet Take 325 mg by mouth every day.      atorvastatin  "(LIPITOR) 40 MG Tab Take 1 Tablet by mouth every evening. 100 Tablet 3    [DISCONTINUED] apixaban (ELIQUIS) 5mg Tab Take 5 mg by mouth 2 times a day.      [DISCONTINUED] tamsulosin (FLOMAX) 0.4 MG capsule Take 0.4 mg by mouth every day.      [DISCONTINUED] acetaminophen (TYLENOL) 500 MG Tab Take 1,000 mg by mouth every 6 hours as needed.       No facility-administered encounter medications on file as of 9/24/2024.     Review of Systems   Constitutional:  Negative for fever and malaise/fatigue.   Respiratory:  Positive for shortness of breath. Negative for cough.         Occasional with emphysema, use of oxygen at night   Cardiovascular:  Positive for palpitations. Negative for chest pain, orthopnea, claudication, leg swelling and PND.        Occasional palpitations   Gastrointestinal:  Negative for abdominal pain.   Musculoskeletal:  Negative for myalgias.   Neurological:  Negative for dizziness.              Objective     /60 (BP Location: Right arm, Patient Position: Sitting, BP Cuff Size: Adult)   Pulse 99   Resp 16   Ht 1.778 m (5' 10\")   Wt 88.5 kg (195 lb)   SpO2 97%   BMI 27.98 kg/m²     Physical Exam  Vitals and nursing note reviewed.   Constitutional:       Appearance: Normal appearance. He is well-developed and normal weight.   HENT:      Head: Normocephalic and atraumatic.   Neck:      Vascular: No JVD.   Cardiovascular:      Rate and Rhythm: Normal rate and regular rhythm.      Pulses: Normal pulses.      Heart sounds: Normal heart sounds.   Pulmonary:      Effort: Pulmonary effort is normal.      Breath sounds: Normal breath sounds.   Musculoskeletal:         General: Normal range of motion.      Comments: Varicose veins bilaterally   Skin:     General: Skin is warm and dry.      Capillary Refill: Capillary refill takes less than 2 seconds.   Neurological:      General: No focal deficit present.      Mental Status: He is alert and oriented to person, place, and time. Mental status is at " baseline.   Psychiatric:         Mood and Affect: Mood normal.         Behavior: Behavior normal.         Thought Content: Thought content normal.         Judgment: Judgment normal.                Assessment & Plan     1. Abdominal aortic aneurysm (AAA) without rupture, unspecified part (HCC)  Lipid Profile    Comp Metabolic Panel      2. Single subsegmental pulmonary embolism without acute cor pulmonale (HCC)        3. Mild left ventricular systolic dysfunction (LVSD)        4. Chronic systolic heart failure (HCC)        5. Chronic respiratory failure with hypoxia ([facility])          Medical Decision Making: Today's Assessment/Status/Plan:      1. AAA repair ([facility])  -imaging just done, stable but leak remains  -follow up with vascular surgeon at [facility] soon  -cont asa, statin  -LDL goal <70  -follow, labs in 6 months    2. Chronic systolic heart failure rEF 45%  -fairly asymptomatic  -no HF medications  -message to Dr. Oconnor to consider bb therapy  -follow    3. Chronic respiratory failure with oxygen and prior PE  -cont aspirin, completed eliquis course  -cont supplemental O2 per pulmonary  -emphysema management per PCP  -follow    Patient is to follow up with Ayleen SHIN in 6 months with labs.

## 2024-09-24 NOTE — TELEPHONE ENCOUNTER
----- Message from Nurse Practitioner CARMINE Short sent at 9/24/2024 12:56 PM PDT -----  Regarding: RE: rEF 45%  Juliana,    Can you let patient know that we will start him on toprol 25 mg QPM and have him watch his BP and HR with this change?    Zahra

## 2024-09-24 NOTE — PATIENT INSTRUCTIONS
Labs in 6 months with follow up.    RE-START statin.    Continue aspirin.    Follow up with Dr. Dominique as planned.    I will reach out to you if Dr. Oconnor wants to start any other heart medications.

## 2024-09-24 NOTE — TELEPHONE ENCOUNTER
Phone Number Called: [phone]      Call outcome: Did not leave a detailed message. Requested patient to call back.

## 2024-09-25 ENCOUNTER — PATIENT MESSAGE (OUTPATIENT)
Dept: CARDIOLOGY | Facility: MEDICAL CENTER | Age: 74
End: 2024-09-25
Payer: MEDICARE

## 2024-09-30 ENCOUNTER — TELEPHONE (OUTPATIENT)
Dept: CARDIOLOGY | Facility: MEDICAL CENTER | Age: 74
End: 2024-09-30
Payer: MEDICARE

## 2024-09-30 DIAGNOSIS — I71.011 AORTIC ARCH DISSECTION (HCC): ICD-10-CM

## 2024-09-30 RX ORDER — ROSUVASTATIN CALCIUM 10 MG/1
10 TABLET, COATED ORAL EVERY EVENING
Qty: 90 TABLET | Refills: 3 | Status: SHIPPED | OUTPATIENT
Start: 2024-09-30

## 2024-09-30 NOTE — TELEPHONE ENCOUNTER
SC    Caller: Onofre Mccormack    Topic/issue: Patient would like to speak with SC if possible. Wants to discuss his medications.     Callback Number: [phone]    Thank you,  Carol ELLIOTT

## 2024-09-30 NOTE — TELEPHONE ENCOUNTER
Phone Number Called: [phone]    Call outcome: Spoke to patient regarding message below.    Message: Called to discuss medication questions with pt and wife. Pt reports having muscle soreness since starting atorvastatin in February. Pt is currently not taking statin due to side effects. Pt wanting to know if he can try a different statin.     To SC, Pt wanting to know if another statin is recommended. Please advise ~thank you

## 2024-09-30 NOTE — TELEPHONE ENCOUNTER
RACHID Martin.  You57 minutes ago (3:11 PM)       Recommend switch to rosuvastatin 10 mg QPM. Repeat lipid/cmp in 6 months. SC     Pt notified of SC's recommendations. Pt verbalized understanding.     Labs ordered.

## 2025-02-18 ENCOUNTER — TELEPHONE (OUTPATIENT)
Dept: CARDIOLOGY | Facility: MEDICAL CENTER | Age: 75
End: 2025-02-18
Payer: MEDICARE

## 2025-02-18 NOTE — TELEPHONE ENCOUNTER
Called pt in regards to lab work that was ordered at previous OV.No answer, LVM to call back. Pt has follow up appointment scheduled with SC on 03.06.2025.

## 2025-02-28 ENCOUNTER — HOSPITAL ENCOUNTER (OUTPATIENT)
Dept: LAB | Facility: MEDICAL CENTER | Age: 75
End: 2025-02-28
Attending: NURSE PRACTITIONER
Payer: MEDICARE

## 2025-02-28 DIAGNOSIS — I71.40 ABDOMINAL AORTIC ANEURYSM (AAA) WITHOUT RUPTURE, UNSPECIFIED PART (HCC): ICD-10-CM

## 2025-02-28 LAB
ALBUMIN SERPL BCP-MCNC: 3.9 G/DL (ref 3.2–4.9)
ALBUMIN/GLOB SERPL: 1.3 G/DL
ALP SERPL-CCNC: 102 U/L (ref 30–99)
ALT SERPL-CCNC: 19 U/L (ref 2–50)
ANION GAP SERPL CALC-SCNC: 12 MMOL/L (ref 7–16)
AST SERPL-CCNC: 25 U/L (ref 12–45)
BILIRUB SERPL-MCNC: 0.5 MG/DL (ref 0.1–1.5)
BUN SERPL-MCNC: 18 MG/DL (ref 8–22)
CALCIUM ALBUM COR SERPL-MCNC: 8.8 MG/DL (ref 8.5–10.5)
CALCIUM SERPL-MCNC: 8.7 MG/DL (ref 8.5–10.5)
CHLORIDE SERPL-SCNC: 104 MMOL/L (ref 96–112)
CHOLEST SERPL-MCNC: 202 MG/DL (ref 100–199)
CO2 SERPL-SCNC: 22 MMOL/L (ref 20–33)
CREAT SERPL-MCNC: 0.96 MG/DL (ref 0.5–1.4)
FASTING STATUS PATIENT QL REPORTED: NORMAL
GFR SERPLBLD CREATININE-BSD FMLA CKD-EPI: 83 ML/MIN/1.73 M 2
GLOBULIN SER CALC-MCNC: 3 G/DL (ref 1.9–3.5)
GLUCOSE SERPL-MCNC: 84 MG/DL (ref 65–99)
HDLC SERPL-MCNC: 44 MG/DL
LDLC SERPL CALC-MCNC: 146 MG/DL
POTASSIUM SERPL-SCNC: 4.2 MMOL/L (ref 3.6–5.5)
PROT SERPL-MCNC: 6.9 G/DL (ref 6–8.2)
SODIUM SERPL-SCNC: 138 MMOL/L (ref 135–145)
TRIGL SERPL-MCNC: 61 MG/DL (ref 0–149)

## 2025-02-28 PROCEDURE — 36415 COLL VENOUS BLD VENIPUNCTURE: CPT

## 2025-02-28 PROCEDURE — 80061 LIPID PANEL: CPT

## 2025-02-28 PROCEDURE — 80053 COMPREHEN METABOLIC PANEL: CPT

## 2025-03-05 ENCOUNTER — RESULTS FOLLOW-UP (OUTPATIENT)
Dept: CARDIOLOGY | Facility: MEDICAL CENTER | Age: 75
End: 2025-03-05

## 2025-03-06 ENCOUNTER — OFFICE VISIT (OUTPATIENT)
Dept: CARDIOLOGY | Facility: MEDICAL CENTER | Age: 75
End: 2025-03-06
Attending: NURSE PRACTITIONER
Payer: MEDICARE

## 2025-03-06 VITALS
DIASTOLIC BLOOD PRESSURE: 64 MMHG | SYSTOLIC BLOOD PRESSURE: 128 MMHG | HEART RATE: 82 BPM | OXYGEN SATURATION: 96 % | BODY MASS INDEX: 28.49 KG/M2 | WEIGHT: 199 LBS | HEIGHT: 70 IN

## 2025-03-06 DIAGNOSIS — I51.89 MILD LEFT VENTRICULAR SYSTOLIC DYSFUNCTION (LVSD): ICD-10-CM

## 2025-03-06 DIAGNOSIS — I71.40 ABDOMINAL AORTIC ANEURYSM (AAA) WITHOUT RUPTURE, UNSPECIFIED PART (HCC): ICD-10-CM

## 2025-03-06 DIAGNOSIS — E78.49 OTHER HYPERLIPIDEMIA: ICD-10-CM

## 2025-03-06 DIAGNOSIS — I26.93 SINGLE SUBSEGMENTAL PULMONARY EMBOLISM WITHOUT ACUTE COR PULMONALE (HCC): ICD-10-CM

## 2025-03-06 DIAGNOSIS — J96.11 CHRONIC RESPIRATORY FAILURE WITH HYPOXIA (HCC): ICD-10-CM

## 2025-03-06 DIAGNOSIS — I50.22 CHRONIC SYSTOLIC HEART FAILURE (HCC): ICD-10-CM

## 2025-03-06 PROCEDURE — 99211 OFF/OP EST MAY X REQ PHY/QHP: CPT | Performed by: NURSE PRACTITIONER

## 2025-03-06 PROCEDURE — 99214 OFFICE O/P EST MOD 30 MIN: CPT | Performed by: NURSE PRACTITIONER

## 2025-03-06 PROCEDURE — 3078F DIAST BP <80 MM HG: CPT | Performed by: NURSE PRACTITIONER

## 2025-03-06 PROCEDURE — 3074F SYST BP LT 130 MM HG: CPT | Performed by: NURSE PRACTITIONER

## 2025-03-06 ASSESSMENT — ENCOUNTER SYMPTOMS
PND: 0
PALPITATIONS: 0
COUGH: 0
SHORTNESS OF BREATH: 0
DIZZINESS: 0
CLAUDICATION: 0
MYALGIAS: 0
FEVER: 0
ABDOMINAL PAIN: 0
ORTHOPNEA: 0

## 2025-03-06 ASSESSMENT — FIBROSIS 4 INDEX: FIB4 SCORE: 0.76

## 2025-03-06 NOTE — PROGRESS NOTES
Chief Complaint   Patient presents with    CHF (Systolic)    Follow-Up     Fu dx: Mild left ventricular systolic dysfunction (LVSD) I51.89     Subjective     Onofre Mauricio Mccormack is a 74 y.o. male who presents today for 6 month follow up.    He is a patient of Dr. Oconnor in our office. Hx of AAA repair, HLD, former smoker with emphysema, LV dysfunction with rEF at 45%, chronic respiratory failure with supplemental oxygen at 2L (mostly nocturnal), and prior PE.    He presents today alone. He is overall doing well. He is not interested in treating his cholesterol or heart failure due to not wanting to take prescription medications, he knows the risks of not treating his medical conditions.    He is functional with no big concerns other than generalized shortness of breath with heavy exertional activity.    He is going to work with supplements at home for cholesterol management.    He has no chest pain, shortness of breath, edema, or dizziness/lightheadedness.    Past Medical History:   Diagnosis Date    AAA (abdominal aortic aneurysm) (HCC)     Arthritis     back    Dental disorder     upper dentures    [place] (hard of hearing)     Pneumonia 04/01/2015    Snoring      Past Surgical History:   Procedure Laterality Date    FUSION, SPINE, LUMBAR, PLIF Right 10/5/2015    Procedure: LUMBAR FUSION POSTERIOR L4-5 XLIF, L5-S1 MINI OPEN TLIF;  Surgeon: Forrest Smalls M.D.;  Location: SURGERY [facility];  Service:     AAA WITH STENT GRAFT  11/19/2014    Performed by Seth Calero M.D. at SURGERY [facility]    OTHER ABDOMINAL SURGERY  1980's    hernia    OTHER      epidurals for back pain /triggerpoint injs     History reviewed. No pertinent family history.  Social History     Socioeconomic History    Marital status:      Spouse name: Not on file    Number of children: Not on file    Years of education: Not on file    Highest education level: Not on file   Occupational History    Not on file   Tobacco Use     Smoking status: Former     Current packs/day: 0.00     Average packs/day: 0.3 packs/day for 40.0 years (10.0 ttl pk-yrs)     Types: Cigarettes     Quit date: 2024     Years since quittin.0    Smokeless tobacco: Never   Vaping Use    Vaping status: Never Used   Substance and Sexual Activity    Alcohol use: No    Drug use: No    Sexual activity: Not on file   Other Topics Concern    Not on file   Social History Narrative    Not on file     Social Drivers of Health     Financial Resource Strain: Not on file   Food Insecurity: Not At Risk (2024)    [facility], [facility]    Food Insecurity     Because difficulties at home can cause health problems, we are asking all of our patients if they are experiencing difficulties in any of the following areas:: Patient does not need help with any of these   Transportation Needs: Not At Risk (2024)    Received from [facility], [facility]    Food Insecurity     Because difficulties at home can cause health problems, we are asking all of our patients if they are experiencing difficulties in any of the following areas:: Patient does not need help with any of these   Physical Activity: Not on file   Stress: Not on file   Social Connections: Not on file   Intimate Partner Violence: Not on file   Housing Stability: Not At Risk (2024)    Received from [facility], [facility]    Food Insecurity     Because difficulties at home can cause health problems, we are asking all of our patients if they are experiencing difficulties in any of the following areas:: Patient does not need help with any of these     Allergies   Allergen Reactions    Penicillins Hives and Itching     Unknown as a child     Outpatient Encounter  "Medications as of 3/6/2025   Medication Sig Dispense Refill    aspirin 81 MG EC tablet Take 81 mg by mouth every day.      ascorbic acid (VITAMIN C) 250 MG tablet Take 250 mg by mouth every day.      rosuvastatin (CRESTOR) 10 MG Tab Take 1 Tablet by mouth every evening. (Patient not taking: Reported on 3/6/2025) 90 Tablet 3    [DISCONTINUED] metoprolol SR (TOPROL XL) 25 MG TABLET SR 24 HR Take 1 Tablet by mouth every evening. (Patient not taking: Reported on 3/6/2025) 90 Tablet 3     No facility-administered encounter medications on file as of 3/6/2025.     Review of Systems   Constitutional:  Negative for fever and malaise/fatigue.   Respiratory:  Negative for cough and shortness of breath.    Cardiovascular:  Negative for chest pain, palpitations, orthopnea, claudication, leg swelling and PND.   Gastrointestinal:  Negative for abdominal pain.   Musculoskeletal:  Negative for myalgias.   Neurological:  Negative for dizziness.              Objective     /64 (BP Location: Left arm, Patient Position: Sitting, BP Cuff Size: Adult)   Pulse 82   Ht 1.778 m (5' 10\")   Wt 90.3 kg (199 lb)   SpO2 96%   BMI 28.55 kg/m²     Physical Exam  Vitals and nursing note reviewed.   Constitutional:       Appearance: Normal appearance. He is well-developed and normal weight.   HENT:      Head: Normocephalic and atraumatic.   Neck:      Vascular: No JVD.   Cardiovascular:      Rate and Rhythm: Normal rate and regular rhythm.      Pulses: Normal pulses.      Heart sounds: Normal heart sounds.   Pulmonary:      Effort: Pulmonary effort is normal.      Breath sounds: Normal breath sounds.   Musculoskeletal:         General: Normal range of motion.      Comments: Varicose veins bilaterally   Skin:     General: Skin is warm and dry.      Capillary Refill: Capillary refill takes less than 2 seconds.   Neurological:      General: No focal deficit present.      Mental Status: He is alert and oriented to person, place, and time. " Mental status is at baseline.   Psychiatric:         Mood and Affect: Mood normal.         Behavior: Behavior normal.         Thought Content: Thought content normal.         Judgment: Judgment normal.                Assessment & Plan     1. Abdominal aortic aneurysm (AAA) without rupture, unspecified part (HCC)        2. Chronic respiratory failure with hypoxia ([facility])        3. Chronic systolic heart failure (HCC)        4. Mild left ventricular systolic dysfunction (LVSD)        5. Single subsegmental pulmonary embolism without acute cor pulmonale ([facility])          Medical Decision Making: Today's Assessment/Status/Plan:      1. AAA repair ([place])  -imaging pending soon, stable leak remains  -follow up with vascular surgeon at [place] soon  -cont asa, statin intolerance  -LDL goal <70, not at goal  -follow, labs in 6 months with supplements only per patient request    2. Chronic systolic heart failure rEF 45%  -fairly asymptomatic  -no HF medications per patient preference  -HF packet given for education   -follow symptoms    3. Chronic respiratory failure with oxygen and prior PE  -cont aspirin, completed eliquis course  -cont supplemental O2 per pulmonary  -emphysema management per PCP  -follow    Patient is to follow up with Ayleen SHIN in 6 months with labs.

## 2025-03-06 NOTE — PATIENT INSTRUCTIONS
Recommend cholesterol management. Repeat cholesterol labs in 6 months to see how were doing with supplements.    Recommend heart failure medications, but at this time we will do no medications and follow up in 6 months per your request.

## 2025-03-28 ENCOUNTER — APPOINTMENT (OUTPATIENT)
Dept: LAB | Facility: MEDICAL CENTER | Age: 75
End: 2025-03-28
Payer: MEDICARE

## 2025-04-02 ENCOUNTER — HOSPITAL ENCOUNTER (OUTPATIENT)
Dept: RADIOLOGY | Facility: MEDICAL CENTER | Age: 75
End: 2025-04-02
Attending: PHYSICIAN ASSISTANT
Payer: MEDICARE

## 2025-04-02 DIAGNOSIS — I71.00 DISSECTING AORTIC ANEURYSM (HCC): ICD-10-CM

## 2025-04-02 PROCEDURE — 71275 CT ANGIOGRAPHY CHEST: CPT

## 2025-04-02 PROCEDURE — 700117 HCHG RX CONTRAST REV CODE 255

## 2025-04-02 RX ADMIN — IOHEXOL 100 ML: 350 INJECTION, SOLUTION INTRAVENOUS at 17:01

## 2025-08-28 ENCOUNTER — PATIENT MESSAGE (OUTPATIENT)
Dept: CARDIOLOGY | Facility: MEDICAL CENTER | Age: 75
End: 2025-08-28
Payer: MEDICARE